# Patient Record
Sex: MALE | Race: WHITE | Employment: UNEMPLOYED | URBAN - METROPOLITAN AREA
[De-identification: names, ages, dates, MRNs, and addresses within clinical notes are randomized per-mention and may not be internally consistent; named-entity substitution may affect disease eponyms.]

---

## 2017-12-05 PROBLEM — E10.42 TYPE 1 DIABETES MELLITUS WITH DIABETIC POLYNEUROPATHY (HCC): Status: ACTIVE | Noted: 2017-12-05

## 2018-03-05 PROBLEM — E10.3393 MODERATE NONPROLIFERATIVE DIABETIC RETINOPATHY OF BOTH EYES WITHOUT MACULAR EDEMA ASSOCIATED WITH TYPE 1 DIABETES MELLITUS (HCC): Status: ACTIVE | Noted: 2018-03-05

## 2018-04-18 PROBLEM — E78.00 HYPERCHOLESTEROLEMIA: Status: ACTIVE | Noted: 2018-04-18

## 2018-07-16 PROBLEM — E10.40 TYPE 1 DIABETES MELLITUS WITH DIABETIC NEUROPATHY (HCC): Status: ACTIVE | Noted: 2018-07-16

## 2018-07-16 PROBLEM — E11.40 TYPE 2 DIABETES MELLITUS WITH DIABETIC NEUROPATHY (HCC): Status: ACTIVE | Noted: 2018-07-16

## 2018-07-16 PROBLEM — E10.9 TYPE 1 DIABETES MELLITUS (HCC): Status: ACTIVE | Noted: 2018-04-18

## 2018-07-16 PROBLEM — E10.649 HYPOGLYCEMIA DUE TO TYPE 1 DIABETES MELLITUS (HCC): Status: ACTIVE | Noted: 2018-07-16

## 2018-08-16 ENCOUNTER — HOSPITAL ENCOUNTER (OUTPATIENT)
Dept: DIABETES SERVICES | Age: 48
Discharge: HOME | End: 2018-08-16
Payer: COMMERCIAL

## 2018-08-16 PROCEDURE — G0108 DIAB MANAGE TRN  PER INDIV: HCPCS

## 2018-08-16 NOTE — PROGRESS NOTES
Came for diabetes educational assessment today. Provided basic information on carbohydrates, proteins and fats. Educational need/plan: Will attend  nutrition/ diabetes sessions to address the following: diabetes disease process, nutritional management, physical activity, using medications, preventing complications, pychosocial adjustment, goal setting, problem solving, monitoring, behavior change strategies. Intense carbohydrate counting will be addressed due to pt with type 1 diabetes. Pt is interested in CGM which will be addressed in the diabetes session. Barrier seen: Pt with ED and PCP gave him a prescription for Viagra but pt states that is too expensive. Pt's fiancee cooks and she will attend education sessions with pt.

## 2018-09-14 PROBLEM — R03.0 ELEVATED BLOOD PRESSURE READING: Status: ACTIVE | Noted: 2018-09-14

## 2018-09-20 ENCOUNTER — HOSPITAL ENCOUNTER (OUTPATIENT)
Dept: DIABETES SERVICES | Age: 48
Discharge: HOME | End: 2018-09-20
Payer: COMMERCIAL

## 2018-09-20 PROCEDURE — G0109 DIAB MANAGE TRN IND/GROUP: HCPCS

## 2018-09-20 NOTE — PROGRESS NOTES
Attended nutrition diabetes group session today. Topics included: type 1 disease process and treatment; carbohydrate choices (emphasizing high fiber carbohydrates); proteins (emphasizing heart healthy choices) and fat food choices (emphasizing unsaturated fats); free foods; combination food choices; nutrition tips for persons with diabetes; snack ideas; resources for diabetes management, fiber and sodium guidelines; sugar substitutes; alcohol; eating out; recipe modification; label reading. Intense carbohydrate counting was taught. Voiced /demonstrated understanding of material covered. Anticipated adherence is good. Problems/barriers may be: none identified at this time. Plan for follow up is attend second nutrition diabetes session on 10/8/18 for continued teaching and practice of carbohydrate counting.

## 2018-10-08 ENCOUNTER — HOSPITAL ENCOUNTER (OUTPATIENT)
Dept: DIABETES SERVICES | Age: 48
Discharge: HOME | End: 2018-10-08
Payer: COMMERCIAL

## 2018-10-08 PROCEDURE — G0109 DIAB MANAGE TRN IND/GROUP: HCPCS

## 2018-10-08 NOTE — PROGRESS NOTES
Client attended nutrition group class for persons with type 1 diabetes. Reviewed what are carbohydrates and intense carbohydrate counting including high fiber foods and foods high in sugar alcohols and high protein meals. Practiced counting carbohydrates. Demonstrated counting carbohydrates correctly. Pt's goal is to control his blood sugars and not have high or low blood sugars, he will count carbohyrates for meals and snacks for one month. Anticipated adherence: good. Pt's support person: Leticia Jordan, his girlfriend. Pt's support plan: Use the books from class and his home gym. Pt verbalized his barrier is the time it takes to count carbohydrates.

## 2018-10-08 NOTE — PROGRESS NOTES
Participant attended Diabetes #1 session today. Topics included: Characteristics/pathophysiology type 1/type 2 diabetes; Goal/acceptable blood glucose ranges/Hgb A1C/interpreting/using results;meters, continuous glucose monitors and insulin pumps, DKA. Using medications safely; Sick day management; Prevention/detection/treatment of acute complications. - Verbalized understanding; Demonstrated ability; Denial/Resistance; Needs reinforcement of material covered. -Goal for next session Diabetes Two   -Anticipated adherence is  good,    -Problems/barriers may be none anticipated at this time.

## 2018-10-15 ENCOUNTER — HOSPITAL ENCOUNTER (OUTPATIENT)
Dept: DIABETES SERVICES | Age: 48
Discharge: HOME | End: 2018-10-15
Payer: COMMERCIAL

## 2018-10-15 PROCEDURE — G0109 DIAB MANAGE TRN IND/GROUP: HCPCS

## 2018-10-15 NOTE — PROGRESS NOTES
Participant attended Diabetes #2 session today. Topics included: Prevention/detection/treatment of chronic complications; sleep apnea; Developing strategies to promote health/change behavior/recommended screenings; Developing strategies to address psychosocial issues; Goal setting. Participants goal/support plan includes Medical Goal:  To control blood sugars I will  Purchase Continuous Glucose Monitoring system ( I have placed order and continue to monitor as needed for Continuous Glucose Monitor and blood sugar control with meter. Plan: For Continuous Glucose Monitor Qualifications : Currently test four times a day Injections are at least three times a day to quality for CGM system. I will monitor CGM once received and communicate with physician for better blood sugar control. Problems/barriers may be:none anticipated; comments: Time is a factor reports very busy but aware he needs to find more time to exercise and control blood sugars.  Insurance  Plan for follow up/Recommendations: mail follow up survey in 3 months

## 2018-10-24 PROBLEM — Z96.41 INSULIN PUMP IN PLACE: Status: ACTIVE | Noted: 2018-10-24

## 2018-10-30 ENCOUNTER — TELEPHONE (OUTPATIENT)
Dept: DIABETES SERVICES | Age: 48
End: 2018-10-30

## 2018-10-30 NOTE — TELEPHONE ENCOUNTER
Called to schedule CGM instruct. Patient reports he started  the CGM on his own. He reports an issue with sensors adhering to his skin and has notified [employer]. Suggested approved adhesive products. He has an appointment here  on 11-7-18 for [facility] instruct. Will notify Luisana Mario with [facility] about possible need for adhesive for the pump.

## 2022-03-18 PROBLEM — E10.40 TYPE 1 DIABETES MELLITUS WITH DIABETIC NEUROPATHY (HCC): Status: ACTIVE | Noted: 2018-07-16

## 2022-03-18 PROBLEM — R03.0 ELEVATED BLOOD PRESSURE READING: Status: ACTIVE | Noted: 2018-09-14

## 2022-03-18 PROBLEM — E10.649 HYPOGLYCEMIA DUE TO TYPE 1 DIABETES MELLITUS (HCC): Status: ACTIVE | Noted: 2018-07-16

## 2022-03-19 PROBLEM — E78.00 HYPERCHOLESTEROLEMIA: Status: ACTIVE | Noted: 2018-04-18

## 2022-03-19 PROBLEM — E10.9: Status: ACTIVE | Noted: 2018-04-18

## 2022-03-20 PROBLEM — E10.3393 MODERATE NONPROLIFERATIVE DIABETIC RETINOPATHY OF BOTH EYES WITHOUT MACULAR EDEMA ASSOCIATED WITH TYPE 1 DIABETES MELLITUS (HCC): Status: ACTIVE | Noted: 2018-03-05

## 2022-03-20 PROBLEM — Z96.41 INSULIN PUMP IN PLACE: Status: ACTIVE | Noted: 2018-10-24

## 2022-06-24 ENCOUNTER — OFFICE VISIT (OUTPATIENT)
Dept: ENDOCRINOLOGY | Age: 52
End: 2022-06-24
Payer: COMMERCIAL

## 2022-06-24 VITALS
HEART RATE: 76 BPM | WEIGHT: 178 LBS | OXYGEN SATURATION: 98 % | BODY MASS INDEX: 29.66 KG/M2 | SYSTOLIC BLOOD PRESSURE: 122 MMHG | DIASTOLIC BLOOD PRESSURE: 68 MMHG | HEIGHT: 65 IN

## 2022-06-24 DIAGNOSIS — I10 PRIMARY HYPERTENSION: ICD-10-CM

## 2022-06-24 DIAGNOSIS — E10.40 TYPE 1 DIABETES MELLITUS WITH DIABETIC NEUROPATHY (HCC): Primary | ICD-10-CM

## 2022-06-24 DIAGNOSIS — Z96.41 INSULIN PUMP IN PLACE: ICD-10-CM

## 2022-06-24 DIAGNOSIS — E10.3393 MODERATE NONPROLIFERATIVE DIABETIC RETINOPATHY OF BOTH EYES WITHOUT MACULAR EDEMA ASSOCIATED WITH TYPE 1 DIABETES MELLITUS (HCC): ICD-10-CM

## 2022-06-24 DIAGNOSIS — E78.00 HYPERCHOLESTEROLEMIA: ICD-10-CM

## 2022-06-24 LAB — HBA1C MFR BLD: 7.8 %

## 2022-06-24 PROCEDURE — 95251 CONT GLUC MNTR ANALYSIS I&R: CPT | Performed by: PHYSICIAN ASSISTANT

## 2022-06-24 PROCEDURE — 83036 HEMOGLOBIN GLYCOSYLATED A1C: CPT | Performed by: PHYSICIAN ASSISTANT

## 2022-06-24 PROCEDURE — 99214 OFFICE O/P EST MOD 30 MIN: CPT | Performed by: PHYSICIAN ASSISTANT

## 2022-06-24 RX ORDER — INSULIN DEGLUDEC INJECTION 100 U/ML
INJECTION, SOLUTION SUBCUTANEOUS
COMMUNITY

## 2022-06-24 RX ORDER — INSULIN PMP CART,AUT,G6/7,CNTR
EACH SUBCUTANEOUS
Qty: 1 KIT | Refills: 0 | Status: SHIPPED | OUTPATIENT
Start: 2022-06-24 | End: 2022-06-24 | Stop reason: SDUPTHER

## 2022-06-24 RX ORDER — BLOOD-GLUCOSE SENSOR
EACH MISCELLANEOUS
COMMUNITY
Start: 2022-05-23 | End: 2022-10-27 | Stop reason: SDUPTHER

## 2022-06-24 RX ORDER — INSULIN PMP CART,AUT,G6/7,CNTR
EACH SUBCUTANEOUS
Qty: 30 EACH | Refills: 3 | Status: SHIPPED | OUTPATIENT
Start: 2022-06-24 | End: 2022-07-14

## 2022-06-24 RX ORDER — INSULIN PMP CART,AUT,G6/7,CNTR
EACH SUBCUTANEOUS
Qty: 1 KIT | Refills: 0 | Status: SHIPPED | OUTPATIENT
Start: 2022-06-24 | End: 2022-07-14

## 2022-06-24 RX ORDER — INSULIN PMP CART,AUT,G6/7,CNTR
EACH SUBCUTANEOUS
Qty: 30 EACH | Refills: 3 | Status: SHIPPED | OUTPATIENT
Start: 2022-06-24 | End: 2022-06-24 | Stop reason: SDUPTHER

## 2022-06-24 RX ORDER — LOSARTAN POTASSIUM 25 MG/1
25 TABLET ORAL DAILY
Qty: 90 TABLET | Refills: 1 | Status: SHIPPED | OUTPATIENT
Start: 2022-06-24 | End: 2022-07-14 | Stop reason: SDUPTHER

## 2022-06-24 RX ORDER — BLOOD-GLUCOSE TRANSMITTER
EACH MISCELLANEOUS
COMMUNITY
Start: 2022-05-23 | End: 2022-10-27 | Stop reason: SDUPTHER

## 2022-06-24 RX ORDER — INSULIN LISPRO 100 [IU]/ML
INJECTION, SOLUTION INTRAVENOUS; SUBCUTANEOUS
COMMUNITY

## 2022-06-24 ASSESSMENT — ENCOUNTER SYMPTOMS
SHORTNESS OF BREATH: 0
COUGH: 1
WHEEZING: 0

## 2022-06-24 NOTE — PROGRESS NOTES
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 The patient utilizes a dexcom G6 continuous glucose monitoring system. The average glucose during the reviewed timeframe was 166 with a standard deviation of 70. There is a pattern of frequent postprandial hyperglycemia after late supper and some hypoglycemia overnight. 1. Type 1 diabetes mellitus with diabetic neuropathy (HCC)  Type 1 diabetes on insulin pump, doing well overall however having some glycemic variability. 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 This is reviewed at length. Insulin setting changes as below. Benefit from hybrid closed-loop system OmniPod 5 now available patient is interested in we will order    - Continuous Blood Gluc Sensor (DEXCOM G6 SENSOR) MISC; USE TO MONITOR BLOOD GLUCOSE, E10.65  - Continuous Blood Gluc Transmit (DEXCOM G6 TRANSMITTER) MISC; USE TO MONITOR BLOOD GLUCOSE, E10.65  - Insulin Infusion Pump MAILE; Pump settings: standard pattern- 24 hour total 18.300 units Time MN  0.95, 3a  0.75, 5am 0.60, 8pm 0.80  Carb Ratio MN  12, 4pm 8 Insulin Sensitivity MN-6a 30, 6a-MN 35 Target low  125 Target high 125 Active Insulin time 4:00 Max Bolus 15 units  Dispense: 1 each;  Refill: 0  - AMB POC HEMOGLOBIN A1C  - Insulin Disposable Pump (OMNIPOD 5 G6 INTRO, GEN 5,) KIT; Use to deliver insulin, E10.65  Dispense: 1 kit; Refill: 0  - Insulin Disposable Pump (OMNIPOD 5 G6 POD, GEN 5,) MISC; Use to deliver insulin, E10.65  Dispense: 30 each; Refill: 3  - AR CONTINUOUS GLUCOSE MONITORING ANALYSIS I&R      2. Insulin pump in place  Is reviewed at length. Patient encouraged to more accurately carb count for best results. Today we will decrease patient's midnight basal from 0.95 down to 0.90. Order OmniPod 5 system  - TRESIBA 100 UNIT/ML SOLN; Inject into the skin IN CASE OF PUMP FAILURE, 18 units SQ daily  - Insulin Infusion Pump MAILE; Pump settings: standard pattern- 24 hour total 18.300 units Time MN  0.95, 3a  0.75, 5am 0.60, 8pm 0.80  Carb Ratio MN  12, 4pm 8 Insulin Sensitivity MN-6a 30, 6a-MN 35 Target low  125 Target high 125 Active Insulin time 4:00 Max Bolus 15 units  Dispense: 1 each; Refill: 0    3. Moderate nonproliferative diabetic retinopathy of both eyes without macular edema associated with type 1 diabetes mellitus ([facility] [address]  Follow up with optho as planned, would benefit from improved glycemic control     4. Primary hypertension  Patient with mild primary hypertension started on lisinopril at previous visit. Patient notes a constant cough worse over the last several weeks without URI symptoms. Stop lisinopril, start losartan, monitor blood pressure    - losartan (COZAAR) 25 MG tablet; Take 1 tablet by mouth daily  Dispense: 90 tablet; Refill: 1    5. Hypercholesterolemia  Last LDL at goal Feb 2022 on atorvastatin 10mg    Lab Results   Component Value Date    LDLCALC 80 02/17/2022                 History of Present Illness:    6/24/2022  Clinic for follow-up on Omni pod eros insulin pump. With hyper and hypoglycemia. During interview becomes clear that he is often guessing his carbohydrates rarely doing any kind of counting or calculation leading to a lot of glycemic variability               2/17/2022   Patient returns clinic doing well overall. We were able to download patient's pump.   Does show some postprandial hyperglycemia in the evenings. No significant hypoglycemia reported. Is tolerating statin and low-dose lisinopril. Stable BP                8/31/2021   Patient returns clinic for follow-up of type 1 diabetes on CGM and OmniPod EROS pump. Unable to download pump data today         5/11/2021   Patient returns clinic doing well with type 1 diabetes on insulin pump therapy experiencing some glycemic variability           Current Regimen: NovoLog by Omni pod pump           2/4/2021   Presents explaining that he has had difficulty with his Arrow's PDM resetting when he changes the battery. Federico Curry has new arrows PDM today to replace old PDM not yet set up   Reports home /77       DIABETES           Date of Diagnosis: Type 1 diabetes mellitus diagnosed 2006. Suffers from frequent hypoglycemia.       Diagnosed in 2006 with polyuria and polydipsia        10/07/2019   Patient returns clinic doing well on insulin pump therapy having recurrent overnight hyperglycemia.  Patient has a very active lifestyle and during the day he has more stable blood  sugar with significant excursions at night.  Reports that his insulin appears less effective on the third day of use and he does work in a hot environment. Federico Curry is concerned about the quality of insulin after 48 hours of use       01/07/2020   Patient returns clinic doing well on insulin therapy, with cooler weather his pot is lasting 72 hours. Holly Bautista is checking blood pressure outside of clinic with diastolic typically  in the 70s and 80s       06/18/2020   Pt RTC for f/u of type 1 DM on insulin pump with CGM, doing well.  Working to carb count more correctly and is eating smaller portions with intentional weight loss               Diet:trying to cut back on carbohydrates.  He eats some sweets at time.  No sweet tea or regular  soft drinks.        Exercise: No regular exercise but active at work             Wt Readings from Last 3 Encounters:        02/17/22  179 lb (81.2 kg) 08/31/21  178 lb (80.7 kg)        05/11/21  175 lb (79.4 kg)                       Wt Readings from Last 3 Encounters:        08/31/21  178 lb (80.7 kg)     05/11/21  175 lb (79.4 kg)     02/04/21  177 lb (80.3 kg)         .                      Wt Readings from Last 3 Encounters:        10/29/20  178 lb (80.7 kg)     06/18/20  168 lb (76.2 kg)     01/07/20  173 lb (78.5 kg)                 Pump: Omnipod       Pump issues: had two pods replaced                 Pump Data: Insulin Pump:                      omnipod EROS pump                        TDD     31.3 units                         Basal                15.4 units, 49%                      Bolus                15.9 units, 51%        Monitoring: dexcom G6         Home blood glucose monitoring frequency:   By review of CGM download over past 30 days  Average blood glucose 166 ± 70  Time in range 58.8%  High 36.7%, Very High 12.6%  Low 4.5%, Very Low 0.7%     Typical Standard Deviation   Fasting 167 71   AC lunch 177 54   AC supper 150 56   Bedtime 186 79     Blood glucose levels are uncontrolled, most significant elevations are at bedtime       Hypoglycemia: +Hypoglycemic awareness.  He has occasional lows in the 50s to 60s, usually during  periods of increased activity, occasional overnight lows, frequently with correction       Hemoglobin A1c:   09/05/2017      7.7%   12/05/2017      9.8%   03/05/2018       7.6%   06/13/2018      8.2%   09/14/2018      6.9%   01/02/2019      6.9%   04/02/2019      6.7%   07/01/2019      6.7%   10/07/2019      7.3%   01/07/2020      6.8%   06/18/2020      6.7%   10/29/2020      6.7%   02/04/2021          7.3%   05/11/2021          7.4%    08/31/2021              7.2%    02/17/2022             7.6%  06/24/2022  7.8%       Microalbumin/Nephropathy:   6/1/2017: Urine microalbumin/creatinine 24.0.   3/5/2018: Creatinine 1.05 (GFR 85).    06/13/2018      Cr 1.12, GFR 78, microalbumin/Cr ratio 18.1   11/08/2018      Cr 1.11, GFR 79   03/28/2019      Cr 1.14, GFR 76   07/02/2019      Cr 1.18, GFR 73, microalbumin/Cr ratio <13.2   01/07/2020      Cr 1.10, .6, microalbumin/Cr ratio 11.6   02/04/2021              Cr 1.14, GFR 75, microalbumin/Cr ratio 13                       Neuropathy: He reports occasional, mild burning, numbness, tingling of feet and hands.  Gabapentin  seems to help the symptoms.       Retinopathy: Eye exam Jan 6083  - he reports retinal bleeding OD>OS. Improving since last visit       Lipids: He tried Crestor  in the past but he reports that his LFTs increased.               Tolerating 20mg Pravastatin with improved compliance       3/5/2018: Total cholesterol 193, triglycerides 49, HDL 86, LDL 97, VLDL 10, AST 42, ALT 66.   06/13/2018  TC- 166, LDL- 86, VLDL- 9,  HDL- 71, TG- 43 -- AST 46, ALT 66   11/08/2018  TC- 153, LDL- 73, VLDL- 14,  HDL- 66, TG- 68 --- AST 40, ALT 52   03/28/2019  TC- 170, LDL- 86, VLDL- 10,  HDL- 74, TG- 48 --- AST 39, ALT 45   01/07/2020  TC- 209, LDL- 114, VLDL- 13,  HDL- 82, TG- 65-- AST 45, ALT 61   06/18/2020  TC- 194, LDL- 106, VLDL- 11,  HDL- 77, TG- 53   Started Atorvastatin 10mg   10/29/2020  TC- 149, LDL- 71, VLDL- 10,  HDL- 68, TG- 41(AST 39, ALT 58)   02/04/2021  TC- 167, LDL- 77, VLDL- 10,  HDL- 80, TG- 49 -(AST 35, ALT 45)                   TSH:               06/13/2018      2.670               01/02/2019      4.10               07/02/2019      3.510               01/07/2020      2.340                   02/04/2021      2.650           Prior Treatment: previously Novolin 70/30 15/25, basal bolus with tresiba/novolog               Allergies & Medications:  Reviewed in chart. Review of Systems   Constitutional: Negative for fever and unexpected weight change. Respiratory: Positive for cough. Negative for shortness of breath and wheezing. Musculoskeletal: Positive for arthralgias (left elbow).        Vital Signs:  /68   Pulse 76   Ht 5' 5\" (1.651 m)   Wt 178 lb (80.7 kg)   SpO2 98%   BMI 29.62 kg/m²       Physical Exam  Constitutional:       Appearance: Normal appearance. Neck:      Thyroid: No thyromegaly. Cardiovascular:      Rate and Rhythm: Normal rate and regular rhythm. Pulmonary:      Effort: Pulmonary effort is normal.      Breath sounds: Normal breath sounds. Abdominal:      Palpations: Abdomen is soft. Feet:      Right foot:      Protective Sensation: 3 sites tested. 3 sites sensed. Left foot:      Protective Sensation: 3 sites tested. 3 sites sensed. Lymphadenopathy:      Cervical: No cervical adenopathy. Skin:     General: Skin is warm and dry. Neurological:      General: No focal deficit present. Mental Status: He is alert. Psychiatric:         Mood and Affect: Mood normal.         Behavior: Behavior normal.         Thought Content: Thought content normal.         Judgment: Judgment normal.             Return in about 3 months (around 9/24/2022) for Type 1 with pump f/u. Portions of this note were generated with the assistance of voice recogniton software. As such, some errors in transcription may be present.

## 2022-07-14 DIAGNOSIS — E10.40 TYPE 1 DIABETES MELLITUS WITH DIABETIC NEUROPATHY (HCC): Primary | ICD-10-CM

## 2022-07-14 DIAGNOSIS — I10 PRIMARY HYPERTENSION: ICD-10-CM

## 2022-07-14 RX ORDER — INSULIN PMP CART,AUT,G6/7,CNTR
EACH SUBCUTANEOUS
Qty: 1 KIT | Refills: 0 | Status: SHIPPED | OUTPATIENT
Start: 2022-07-14

## 2022-07-14 RX ORDER — INSULIN PMP CART,AUT,G6/7,CNTR
EACH SUBCUTANEOUS
Qty: 30 EACH | Refills: 3 | Status: SHIPPED | OUTPATIENT
Start: 2022-07-14

## 2022-07-14 RX ORDER — LOSARTAN POTASSIUM 25 MG/1
25 TABLET ORAL DAILY
Qty: 90 TABLET | Refills: 1 | Status: SHIPPED | OUTPATIENT
Start: 2022-07-14

## 2022-10-27 ENCOUNTER — OFFICE VISIT (OUTPATIENT)
Dept: ENDOCRINOLOGY | Age: 52
End: 2022-10-27
Payer: COMMERCIAL

## 2022-10-27 VITALS
WEIGHT: 177.6 LBS | BODY MASS INDEX: 29.55 KG/M2 | SYSTOLIC BLOOD PRESSURE: 132 MMHG | DIASTOLIC BLOOD PRESSURE: 80 MMHG | HEART RATE: 72 BPM | OXYGEN SATURATION: 97 %

## 2022-10-27 DIAGNOSIS — E10.40 TYPE 1 DIABETES MELLITUS WITH DIABETIC NEUROPATHY (HCC): Primary | ICD-10-CM

## 2022-10-27 DIAGNOSIS — E78.00 HYPERCHOLESTEROLEMIA: ICD-10-CM

## 2022-10-27 DIAGNOSIS — I10 PRIMARY HYPERTENSION: ICD-10-CM

## 2022-10-27 DIAGNOSIS — E10.3393 MODERATE NONPROLIFERATIVE DIABETIC RETINOPATHY OF BOTH EYES WITHOUT MACULAR EDEMA ASSOCIATED WITH TYPE 1 DIABETES MELLITUS (HCC): ICD-10-CM

## 2022-10-27 DIAGNOSIS — Z96.41 INSULIN PUMP IN PLACE: ICD-10-CM

## 2022-10-27 LAB — HBA1C MFR BLD: 7.8 %

## 2022-10-27 PROCEDURE — 99214 OFFICE O/P EST MOD 30 MIN: CPT | Performed by: PHYSICIAN ASSISTANT

## 2022-10-27 PROCEDURE — 83036 HEMOGLOBIN GLYCOSYLATED A1C: CPT | Performed by: PHYSICIAN ASSISTANT

## 2022-10-27 PROCEDURE — 3078F DIAST BP <80 MM HG: CPT | Performed by: PHYSICIAN ASSISTANT

## 2022-10-27 PROCEDURE — 3074F SYST BP LT 130 MM HG: CPT | Performed by: PHYSICIAN ASSISTANT

## 2022-10-27 PROCEDURE — 95251 CONT GLUC MNTR ANALYSIS I&R: CPT | Performed by: PHYSICIAN ASSISTANT

## 2022-10-27 RX ORDER — BLOOD-GLUCOSE SENSOR
EACH MISCELLANEOUS
Qty: 9 EACH | Refills: 3 | Status: SHIPPED | OUTPATIENT
Start: 2022-10-27

## 2022-10-27 RX ORDER — BLOOD-GLUCOSE TRANSMITTER
EACH MISCELLANEOUS
Qty: 1 EACH | Refills: 3 | Status: SHIPPED | OUTPATIENT
Start: 2022-10-27

## 2022-10-27 NOTE — PROGRESS NOTES
[facility]    Kisha Darnell PA-C  [facility]  [address], [address]  [place], [place]  Phone [phone]  Facsimile [phone]          Camella Curling is a 47 y.o. male seen 10/27/2022 for follow up evaluation of type 1 diabetes        Assessment and Plan:    In office COVID-19 PPE worn and precautions taken  Interpretation of 72 hour glucose monitor: At least 72 hours of data were reviewed. The patient utilizes a dexcom G6 continuous glucose monitoring system. The average glucose during the reviewed timeframe was 159 with a standard deviation of 56. There is a pattern of frequent late afternoon hypoglycemia. 1. Type 1 diabetes mellitus with diabetic neuropathy ([address]  Doing well. Insulin setting changes below    - AMB POC HEMOGLOBIN A1C  - Comprehensive Metabolic Panel; Future  - CBC with Auto Differential; Future  - Microalbumin / Creatinine Urine Ratio; Future  - Lipid Panel; Future  - Hemoglobin A1C; Future  - TSH with Reflex; Future  - Continuous Blood Gluc Transmit (DEXCOM G6 TRANSMITTER) MISC; USE TO MONITOR BLOOD GLUCOSE, E10.65  Dispense: 1 each; Refill: 3  - Continuous Blood Gluc Sensor (DEXCOM G6 SENSOR) MISC; USE TO MONITOR BLOOD GLUCOSE, E10.65  Dispense: 9 each; Refill: 3  - Insulin Infusion Pump MAILE; Pump settings: standard pattern- 24 hour total 18.300 units Time MN  0.90, 3a  0.75, 5am 0.55, 8pm 0.80  Carb Ratio MN  11, 4pm 8 Insulin Sensitivity MN-6a 30, 6a-MN 35 Target low  125 Target high 125 Active Insulin time 4:00 Max Bolus 15 units  Dispense: 1 each; Refill: 0  - [place] CONTINUOUS GLUCOSE MONITORING ANALYSIS I&R      2. Insulin pump in place  Discussed the impact of postprandial spikes. Encourage early bolusing. Encourage proper carb counting.   Decrease carb ratio from 12 down to 11 at midnight and decrease 5 AM basal from 0.6 down to 0.55 to help with late afternoon hypoglycemia  - Insulin Infusion Pump MAILE; Pump settings: standard pattern- 24 hour total 18.300 units Time MN  0.90, 3a  0.75, 5am 0.55, 8pm 0.80  Carb Ratio MN  11, 4pm 8 Insulin Sensitivity MN-6a 30, 6a-MN 35 Target low  125 Target high 125 Active Insulin time 4:00 Max Bolus 15 units  Dispense: 1 each; Refill: 0    3. Moderate nonproliferative diabetic retinopathy of both eyes without macular edema associated with type 1 diabetes mellitus ([facility]    Follow up with optho as planned, would benefit from improved glycemic control     4. Primary hypertension  Continue losartan and lieu of lisinopril with improvement of cough. Not monitoring at home. Urged to keep blood pressure log  - Microalbumin / Creatinine Urine Ratio; Future    5. Hypercholesterolemia  Last LDL at goal Feb 2022 on atorvastatin 10mg  - Comprehensive Metabolic Panel; Future  - Lipid Panel; Future      2. Insulin pump in place  Is reviewed at length. Patient encouraged to more accurately carb count for best results. Today we will decrease patient's midnight basal from 0.95 down to 0.90. Order OmniPod 5 system  - TRESIBA 100 UNIT/ML SOLN; Inject into the skin IN CASE OF PUMP FAILURE, 18 units SQ daily  - Insulin Infusion Pump MAILE; Pump settings: standard pattern- 24 hour total 18.300 units Time MN  0.95, 3a  0.75, 5am 0.60, 8pm 0.80  Carb Ratio MN  12, 4pm 8 Insulin Sensitivity MN-6a 30, 6a-MN 35 Target low  125 Target high 125 Active Insulin time 4:00 Max Bolus 15 units  Dispense: 1 each; Refill: 0     3. Moderate nonproliferative diabetic retinopathy of both eyes without macular edema associated with type 1 diabetes mellitus ([facility]  Follow up with optho as planned, would benefit from improved glycemic control      4. Primary hypertension  Patient with mild primary hypertension started on lisinopril at previous visit. Patient notes a constant cough worse over the last several weeks without URI symptoms.   Stop lisinopril, start losartan, monitor blood pressure     - losartan (COZAAR) 25 MG tablet; Take 1 tablet by mouth daily  Dispense: 90 tablet; Refill: 1     5. Hypercholesterolemia  Last LDL at goal Feb 2022 on atorvastatin 10mg       Dennys Armendariz was seen today for follow-up and diabetes. Diagnoses and all orders for this visit:    Type 1 diabetes mellitus with diabetic neuropathy ([facility]  -     AMB POC HEMOGLOBIN A1C  -     Comprehensive Metabolic Panel; Future  -     CBC with Auto Differential; Future  -     Microalbumin / Creatinine Urine Ratio; Future  -     Lipid Panel; Future  -     Hemoglobin A1C; Future  -     TSH with Reflex; Future  -     Continuous Blood Gluc Transmit (DEXCOM G6 TRANSMITTER) MISC; USE TO MONITOR BLOOD GLUCOSE, E10.65  -     Continuous Blood Gluc Sensor (DEXCOM G6 SENSOR) MISC; USE TO MONITOR BLOOD GLUCOSE, E10.65  -     Insulin Infusion Pump MAILE; Pump settings: standard pattern- 24 hour total 18.300 units Time MN  0.90, 3a  0.75, 5am 0.55, 8pm 0.80  Carb Ratio MN  11, 4pm 8 Insulin Sensitivity MN-6a 30, 6a-MN 35 Target low  125 Target high 125 Active Insulin time 4:00 Max Bolus 15 units  -     [place] CONTINUOUS GLUCOSE MONITORING ANALYSIS I&R    Insulin pump in place  -     Insulin Infusion Pump MAILE; Pump settings: standard pattern- 24 hour total 18.300 units Time MN  0.90, 3a  0.75, 5am 0.55, 8pm 0.80  Carb Ratio MN  11, 4pm 8 Insulin Sensitivity MN-6a 30, 6a-MN 35 Target low  125 Target high 125 Active Insulin time 4:00 Max Bolus 15 units    Moderate nonproliferative diabetic retinopathy of both eyes without macular edema associated with type 1 diabetes mellitus (HCC)    Primary hypertension  -     Microalbumin / Creatinine Urine Ratio; Future    Hypercholesterolemia  -     Comprehensive Metabolic Panel; Future  -     Lipid Panel; Future      History of Present Illness:    10/27/2022  Patient with type 1 diabetes on insulin pump returns to clinic for follow-up.   He has received the OmniPod 5 however is completing his last 90-day prescription of his EROS pods. 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 He is aware he can use temporary basal but often forgets    6/24/2022  Clinic for follow-up on Omni pod eros insulin pump. With hyper and hypoglycemia. During interview becomes clear that he is often guessing his carbohydrates rarely doing any kind of counting or calculation leading to a lot of glycemic variability                  2/17/2022   Patient returns clinic doing well overall. We were able to download patient's pump. Does show some postprandial hyperglycemia in the evenings. No significant hypoglycemia reported. Is tolerating statin and low-dose lisinopril. Stable BP                8/31/2021   Patient returns clinic for follow-up of type 1 diabetes on CGM and OmniPod EROS pump. Unable to download pump data today          5/11/2021   Patient returns clinic doing well with type 1 diabetes on insulin pump therapy experiencing some glycemic variability           Current Regimen: NovoLog by Omni pod pump           2/4/2021   Presents explaining that he has had difficulty with his Arrow's PDM resetting when he changes the battery. He has new arrows PDM today to replace old PDM not yet set up   Reports home /77       DIABETES           Date of Diagnosis: Type 1 diabetes mellitus diagnosed 2006. Suffers from frequent hypoglycemia. Diagnosed in 2006 with polyuria and polydipsia        10/07/2019   Patient returns clinic doing well on insulin pump therapy having recurrent overnight hyperglycemia. Patient has a very active lifestyle and during the day he has more stable blood  sugar with significant excursions at night. Reports that his insulin appears less effective on the third day of use and he does work in a hot environment.   He is concerned about the quality of insulin after 48 hours of use       01/07/2020   Patient returns clinic doing well on insulin therapy, with cooler weather his pot is lasting 72 hours. Patient is checking blood pressure outside of clinic with diastolic typically  in the 70s and 80s       06/18/2020   Pt RTC for f/u of type 1 DM on insulin pump with CGM, doing well. Working to carb count more correctly and is eating smaller portions with intentional weight loss               Diet:trying to cut back on carbohydrates. He eats some sweets at time. No sweet tea or regular  soft drinks. Exercise: No regular exercise but active at work               [employer] Readings from Last 3 Encounters:        02/17/22  179 lb (81.2 kg)     08/31/21  178 lb (80.7 kg)        05/11/21  175 lb (79.4 kg)                       Wt Readings from Last 3 Encounters:        08/31/21  178 lb (80.7 kg)     05/11/21  175 lb (79.4 kg)     02/04/21  177 lb (80.3 kg)         . Wt Readings from Last 3 Encounters:        10/29/20  178 lb (80.7 kg)     06/18/20  168 lb (76.2 kg)     01/07/20  173 lb (78.5 kg)                 Pump: Omnipod       Pump issues: had two pods replaced                 Pump Data: Insulin Pump:                      omnipod EROS pump      Insulin Pump:    Omnipod EROS      TDD 31.4 units      Basal   15.5units, 49%    Bolus   16 units, 51%    Home blood glucose monitoring frequency:   By review of CGM download over past 30 days  Average blood glucose 159 ± 56  Time in range 66.5%  High 32.0%, Very High 8.0%  Low 1.5%, Very Low 0.1%     Typical Standard Deviation   Fasting 163 60   AC lunch 158 46   AC supper 152 60   Bedtime 161 77     Blood glucose levels are uncontrolled, most significant elevations are post prandial, some hypoglycemia       Hypoglycemia: +Hypoglycemic awareness.   He has occasional lows in the 50s to 60s, usually during  periods of increased activity, occasional overnight lows, frequently with correction       Hemoglobin A1c: 09/05/2017      7.7%   12/05/2017      9.8%   03/05/2018       7.6%   06/13/2018      8.2%   09/14/2018      6.9%   01/02/2019      6.9%   04/02/2019      6.7%   07/01/2019      6.7%   10/07/2019      7.3%   01/07/2020      6.8%   06/18/2020      6.7%   10/29/2020      6.7%   02/04/2021          7.3%   05/11/2021          7.4%    08/31/2021              7.2%    02/17/2022             7.6%  06/24/2022              7.8%  10/27/2022  7.8%       Microalbumin/Nephropathy:   6/1/2017: Urine microalbumin/creatinine 24.0.   3/5/2018: Creatinine 1.05 (GFR 85). 06/13/2018      Cr 1.12, GFR 78, microalbumin/Cr ratio 18.1   11/08/2018      Cr 1.11, GFR 79   03/28/2019      Cr 1.14, GFR 76   07/02/2019      Cr 1.18, GFR 73, microalbumin/Cr ratio <13.2   01/07/2020      Cr 1.10, .6, microalbumin/Cr ratio 11.6   02/04/2021              Cr 1.14, GFR 75, microalbumin/Cr ratio 13    02/17/2022 Cr 1.24, GFR 67, microalbumin/Cr ratio <9               Neuropathy: He reports occasional, mild burning, numbness, tingling of feet and hands. Gabapentin  seems to help the symptoms. Retinopathy: Eye exam June 2022  - he reports retinal bleeding OD>OS. Improving since last visit       Lipids: He tried Crestor  in the past but he reports that his LFTs increased. Tolerating 20mg Pravastatin with improved compliance       3/5/2018:  Total cholesterol 193, triglycerides 49, HDL 86, LDL 97, VLDL 10, AST 42, ALT 66.   06/13/2018  TC- 166, LDL- 86, VLDL- 9,  HDL- 71, TG- 43 -- AST 46, ALT 66   11/08/2018  TC- 153, LDL- 73, VLDL- 14,  HDL- 66, TG- 68 --- AST 40, ALT 52   03/28/2019  TC- 170, LDL- 86, VLDL- 10,  HDL- 74, TG- 48 --- AST 39, ALT 45   01/07/2020  TC- 209, LDL- 114, VLDL- 13,  HDL- 82, TG- 65-- AST 45, ALT 61   06/18/2020  TC- 194, LDL- 106, VLDL- 11,  HDL- 77, TG- 53   Started Atorvastatin 10mg   10/29/2020  TC- 149, LDL- 71, VLDL- 10,  HDL- 68, TG- 41(AST 39, ALT 58)   02/04/2021  TC- 167, LDL- 77, VLDL- 10, HDL- 80, TG- 49 -(AST 35, ALT 45)                   TSH:               06/13/2018      2.670               01/02/2019      4.10               07/02/2019      3.510               01/07/2020      2.340                   02/04/2021      2.650           Prior Treatment: previously Novolin 70/30 15/25, basal bolus with tresiba/novolog          Allergies & Medications:  Reviewed in chart. Review of Systems    Vital Signs:  /80   Pulse 72   Wt 177 lb 9.6 oz (80.6 kg)   SpO2 97%   BMI 29.55 kg/m²       Physical Exam  Constitutional:       Appearance: Normal appearance. Neck:      Thyroid: No thyromegaly. Cardiovascular:      Rate and Rhythm: Normal rate and regular rhythm. Pulmonary:      Effort: Pulmonary effort is normal.      Breath sounds: Normal breath sounds. Abdominal:      Palpations: Abdomen is soft. Feet:      Right foot:      Protective Sensation: 3 sites tested. 3 sites sensed. Left foot:      Protective Sensation: 3 sites tested. 3 sites sensed. Lymphadenopathy:      Cervical: No cervical adenopathy. Skin:     General: Skin is warm and dry. Neurological:      General: No focal deficit present. Mental Status: He is alert. Psychiatric:         Mood and Affect: Mood normal.         Behavior: Behavior normal.         Thought Content: Thought content normal.         Judgment: Judgment normal.           Return in about 3 months (around 1/27/2023) for Type 1 with pump f/u. Portions of this note were generated with the assistance of voice recogniton software. As such, some errors in transcription may be present.

## 2023-02-20 ENCOUNTER — OFFICE VISIT (OUTPATIENT)
Dept: ENDOCRINOLOGY | Age: 53
End: 2023-02-20
Payer: COMMERCIAL

## 2023-02-20 VITALS
HEART RATE: 75 BPM | DIASTOLIC BLOOD PRESSURE: 82 MMHG | SYSTOLIC BLOOD PRESSURE: 123 MMHG | WEIGHT: 182 LBS | BODY MASS INDEX: 30.29 KG/M2 | OXYGEN SATURATION: 98 %

## 2023-02-20 DIAGNOSIS — Z96.41 INSULIN PUMP IN PLACE: ICD-10-CM

## 2023-02-20 DIAGNOSIS — I10 PRIMARY HYPERTENSION: ICD-10-CM

## 2023-02-20 DIAGNOSIS — E10.40 TYPE 1 DIABETES MELLITUS WITH DIABETIC NEUROPATHY (HCC): Primary | ICD-10-CM

## 2023-02-20 DIAGNOSIS — E10.3393 MODERATE NONPROLIFERATIVE DIABETIC RETINOPATHY OF BOTH EYES WITHOUT MACULAR EDEMA ASSOCIATED WITH TYPE 1 DIABETES MELLITUS (HCC): ICD-10-CM

## 2023-02-20 DIAGNOSIS — E78.00 HYPERCHOLESTEROLEMIA: ICD-10-CM

## 2023-02-20 LAB — HBA1C MFR BLD: 8 %

## 2023-02-20 PROCEDURE — 99214 OFFICE O/P EST MOD 30 MIN: CPT | Performed by: PHYSICIAN ASSISTANT

## 2023-02-20 PROCEDURE — 3074F SYST BP LT 130 MM HG: CPT | Performed by: PHYSICIAN ASSISTANT

## 2023-02-20 PROCEDURE — 3079F DIAST BP 80-89 MM HG: CPT | Performed by: PHYSICIAN ASSISTANT

## 2023-02-20 PROCEDURE — 83036 HEMOGLOBIN GLYCOSYLATED A1C: CPT | Performed by: PHYSICIAN ASSISTANT

## 2023-02-20 PROCEDURE — 95251 CONT GLUC MNTR ANALYSIS I&R: CPT | Performed by: PHYSICIAN ASSISTANT

## 2023-02-20 RX ORDER — ALBUTEROL SULFATE 90 UG/1
2 AEROSOL, METERED RESPIRATORY (INHALATION) 4 TIMES DAILY PRN
Qty: 18 G | Refills: 0 | Status: SHIPPED | OUTPATIENT
Start: 2023-02-20

## 2023-02-20 NOTE — PROGRESS NOTES
[facility] ENDOCRINOLOGY   AND   THYROID NODULE CLINIC    Kylie Frias PA-C  [facility] Endocrinology and Thyroid Nodule Clinic  [address] [place] [address], [address] [address]  Phone [phone]  Facsimile [phone]          Linwood Marshall is a 52 y.o. male seen 2/20/2023 for follow up evaluation of type 1 diabetes on insulin pump        Assessment and Plan:          Interpretation of 72 hour glucose monitor:  At least 72 hours of data were reviewed.  The patient utilizes a dexcom G6 continuous glucose monitoring system.  The average glucose during the reviewed timeframe was 185 with a standard deviation of 69.  There is a pattern of frequent late afternoon hypoglycemia.    1. Type 1 diabetes mellitus with diabetic neuropathy (HCC)  Glycemic control is suboptimal, insulin setting changes as below.  This appears to be aggravated in the context of recurrent URIs.  Patient strongly and repeatedly encouraged to follow-up with primary care as he has been obtaining treatment from urgent care without consistent follow-up.  He does complain of intermittent wheeze when working outdoors and sometimes at night.  He admits this was previously improved when using an inhaler.  He was referred to his original primary care due to a new diagnosis of asthma however he does not believe that he has asthma.  I will order him a Ventolin inhaler for short-term use but strongly encouraged him to follow-up with primary care for further evaluation and treatment.  Patient verbalizes understanding    - AMB POC HEMOGLOBIN A1C  - Comprehensive Metabolic Panel; Future  - CBC with Auto Differential; Future  - Microalbumin / Creatinine Urine Ratio; Future  - Lipid Panel; Future  - Hemoglobin A1C; Future  - TSH with Reflex; Future  - [place] CONTINUOUS GLUCOSE MONITORING ANALYSIS I&R  - Insulin Infusion Pump MAILE; Pump settings: standard pattern- 24 hour total 18.300 units Time MN  0.90, 3a  0.75, 5am 0.65, 8pm 0.80  Carb Ratio  MN  11, 4pm 8 Insulin Sensitivity MN-6a 30, 6a-MN 40 Target low  120 Target high 130 Active Insulin time 4:00 Max Bolus 15 units  Dispense: 1 each; Refill: 0    2. Insulin pump in place  Patient experiencing both hyperand hypoglycemia, primarily hyperglycemia receiving vastly more bolus than basal insulin. Increase 5 AM basal from 0.55 up to 0.65 adjusting target which is currently at 125 for high and low target to a low target of 120 with a correct above threshold of 130 adjust daytime correction at 6 AM from 35 up to 40    - Insulin Infusion Pump MAILE; Pump settings: standard pattern- 24 hour total 18.300 units Time MN  0.90, 3a  0.75, 5am 0.65, 8pm 0.80  Carb Ratio MN  11, 4pm 8 Insulin Sensitivity MN-6a 30, 6a-MN 40 Target low  120 Target high 130 Active Insulin time 4:00 Max Bolus 15 units  Dispense: 1 each; Refill: 0    3. Moderate nonproliferative diabetic retinopathy of both eyes without macular edema associated with type 1 diabetes mellitus ([facility]  Follow up with ophtho as planned    4. Primary hypertension  BP Readings from Last 3 Encounters:   02/20/23 123/82   10/27/22 132/80   06/24/22 122/68         5. Hypercholesterolemia  Last LDL at goal Feb 2022 on atorvastatin 10mg  - Comprehensive Metabolic Panel; Future  - Lipid Panel; Future         Sarah Whitaker was seen today for diabetes. Diagnoses and all orders for this visit:    Type 1 diabetes mellitus with diabetic neuropathy ([facility]  -     AMB POC HEMOGLOBIN A1C  -     Comprehensive Metabolic Panel; Future  -     CBC with Auto Differential; Future  -     Microalbumin / Creatinine Urine Ratio; Future  -     Lipid Panel; Future  -     Hemoglobin A1C; Future  -     TSH with Reflex;  Future  -     [place] CONTINUOUS GLUCOSE MONITORING ANALYSIS I&R  -     Insulin Infusion Pump MAILE; Pump settings: standard pattern- 24 hour total 18.300 units Time MN  0.90, 3a  0.75, 5am 0.65, 8pm 0.80  Carb Ratio MN  11, 4pm 8 Insulin Sensitivity MN-6a 30, 6a-MN 40 Target low  120 Target high 130 Active Insulin time 4:00 Max Bolus 15 units    Insulin pump in place  -     Insulin Infusion Pump MAILE; Pump settings: standard pattern- 24 hour total 18.300 units Time MN  0.90, 3a  0.75, 5am 0.65, 8pm 0.80  Carb Ratio MN  11, 4pm 8 Insulin Sensitivity MN-6a 30, 6a-MN 40 Target low  120 Target high 130 Active Insulin time 4:00 Max Bolus 15 units    Moderate nonproliferative diabetic retinopathy of both eyes without macular edema associated with type 1 diabetes mellitus (HCC)    Primary hypertension    Hypercholesterolemia  -     Comprehensive Metabolic Panel; Future  -     Lipid Panel; Future    Other orders  -     albuterol sulfate HFA (VENTOLIN HFA) 108 (90 Base) MCG/ACT inhaler; Inhale 2 puffs into the lungs 4 times daily as needed for Wheezing          History of Present Illness:        2/20/2023   Interim diabetes HPI:    Patient with type 1 diabetes on insulin pump returns clinic for follow-up. Has had recurrent URIs with highly variable glucose including during recent COVID-19 infection. 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    Current Regimen:     Glucose data:     Insulin Pump:    Omnipod EROS      TDD 36.8units      Basal   13.5 units, 37%    Bolus   23.3 units, 63%    Home blood glucose monitoring frequency:   By review of CGM download over past 30 days  Average blood glucose 185 ± 69  Time in range 44%  High 35%, Very High 17%  Low 3%, Very Low <1%    Range     Blood glucose levels are uncontrolled, most significant elevations are all day      Failed past therapies:   previously Novolin 70/30 15/25, basal bolus with tresiba/novolog Relevant co morbidities:    Denies  HX pancreatitis, DKA, gastroparesis, foot ulcer    Optho:  Eye exam Jan 2022  - he reports retinal bleeding OD>OS. Improving since last visit      Obesity:         Body mass index is 30.29 kg/m². increasing steadily      Wt Readings from Last 3 Encounters:   02/20/23 182 lb (82.6 kg)   10/27/22 177 lb 9.6 oz (80.6 kg)   06/24/22 178 lb (80.7 kg)         CardioVascular:    None     Renal:    Under care of nephro? no    On ARB/ACE-I  losartan - 25 MG      6/1/2017: Urine microalbumin/creatinine 24.0.   3/5/2018: Creatinine 1.05 (GFR 85). 06/13/2018      Cr 1.12, GFR 78, microalbumin/Cr ratio 18.1   11/08/2018      Cr 1.11, GFR 79   03/28/2019      Cr 1.14, GFR 76   07/02/2019      Cr 1.18, GFR 73, microalbumin/Cr ratio <13.2   01/07/2020      Cr 1.10, .6, microalbumin/Cr ratio 11.6   02/04/2021              Cr 1.14, GFR 75, microalbumin/Cr ratio 13    02/17/2022 Cr 1.24, GFR 67, microalbumin/Cr ratio <9       Lipids:     Current therapy atorvastatin - 10 MG with good compliance  He tried Crestor  in the past but he reports that his LFTs increased. Tolerated 20mg Pravastatin with improved compliance but poor control   3/5/2018:  Total cholesterol 193, triglycerides 49, HDL 86, LDL 97, VLDL 10, AST 42, ALT 66.   06/13/2018  TC- 166, LDL- 86, VLDL- 9,  HDL- 71, TG- 43 -- AST 46, ALT 66   11/08/2018  TC- 153, LDL- 73, VLDL- 14,  HDL- 66, TG- 68 --- AST 40, ALT 52   03/28/2019  TC- 170, LDL- 86, VLDL- 10,  HDL- 74, TG- 48 --- AST 39, ALT 45   01/07/2020  TC- 209, LDL- 114, VLDL- 13,  HDL- 82, TG- 65-- AST 45, ALT 61   06/18/2020  TC- 194, LDL- 106, VLDL- 11,  HDL- 77, TG- 53   Started Atorvastatin 10mg   10/29/2020  TC- 149, LDL- 71, VLDL- 10,  HDL- 68, TG- 41(AST 39, ALT 58)   02/04/2021  TC- 167, LDL- 77, VLDL- 10,  HDL- 80, TG- 49 -(AST 35, ALT 45)    02/17/2022  TC- 157, LDL- 80, VLDL- 11,  HDL- 65, TG- 61 (AST 43, 48)        Lab Results   Component Value Date    CHOL 157 02/17/2022    CHOL 167 02/04/2021    CHOL 149 10/29/2020     Lab Results   Component Value Date    LDLCALC 80 02/17/2022    LDLCALC 77 02/04/2021    LDLCALC 71 10/29/2020      Lab Results   Component Value Date    LABVLDL 11 06/18/2020    LABVLDL 13 01/07/2020    LABVLDL 8 07/02/2019    VLDL 12 02/17/2022    VLDL 10 02/04/2021    VLDL 10 10/29/2020      Lab Results   Component Value Date    HDL 65 02/17/2022    HDL 80 02/04/2021    HDL 68 10/29/2020      Lab Results   Component Value Date    HDL 65 02/17/2022    HDL 80 02/04/2021    HDL 68 10/29/2020      Lab Results   Component Value Date    TRIG 61 02/17/2022    TRIG 49 02/04/2021    TRIG 41 10/29/2020     No results found for: CHOLHDLRATIO      Hemoglobin A1c:  09/05/2017      7.7%   12/05/2017      9.8%   03/05/2018       7.6%   06/13/2018      8.2%   09/14/2018      6.9%   01/02/2019      6.9%   04/02/2019      6.7%   07/01/2019      6.7%   10/07/2019      7.3%   01/07/2020      6.8%   06/18/2020      6.7%   10/29/2020      6.7%   02/04/2021          7.3%   05/11/2021          7.4%    08/31/2021              7.2%    02/17/2022             7.6%  06/24/2022              7.8%  10/27/2022  7.8%  02/20/2023  8.0%  Hemoglobin A1C, POC   Date Value Ref Range Status   02/20/2023 8.0 % Final   10/27/2022 7.8 % Final   06/24/2022 7.8 % Final        Thyroid:   06/13/2018      2.670               01/02/2019      4.10               07/02/2019      3.510               01/07/2020      2.340                   02/04/2021      2.650      Lab Results   Component Value Date/Time    TSH 2.360 02/17/2022 09:38 AM    TSH 2.650 02/04/2021 09:38 AM    TSH 2.340 01/07/2020 09:35 AM          Reviewed and updated this visit by provider:  Tobacco  Allergies  Meds  Problems  Med Hx  Surg Hx  Fam Hx                    Allergies & Medications:  Reviewed in chart.         Review of Systems    Vital Signs:  /82   Pulse 75   Wt 182 lb (82.6 kg)   SpO2 98% BMI 30.29 kg/m²       Physical Exam  Constitutional:       Appearance: Normal appearance. He is obese. Neck:      Thyroid: No thyromegaly. Cardiovascular:      Rate and Rhythm: Normal rate and regular rhythm. Pulmonary:      Effort: Pulmonary effort is normal.      Breath sounds: Normal breath sounds. Abdominal:      Palpations: Abdomen is soft. Feet:      Right foot:      Protective Sensation: 3 sites tested. 3 sites sensed. Left foot:      Protective Sensation: 3 sites tested. 3 sites sensed. Lymphadenopathy:      Cervical: No cervical adenopathy. Skin:     General: Skin is warm and dry. Neurological:      General: No focal deficit present. Mental Status: He is alert. Psychiatric:         Mood and Affect: Mood normal.         Behavior: Behavior normal.         Thought Content: Thought content normal.         Judgment: Judgment normal.           Return in about 3 months (around 5/20/2023) for Type 1 with pump f/u. Portions of this note were generated with the assistance of voice recogniton software. As such, some errors in transcription may be present.

## 2023-02-20 NOTE — Clinical Note
Dr. Rosenda Saucedo encouraged him to schedule a much overdue follow up with you. Chronic cough is his biggest complaint. I've changed his ACE-I to ARB, he has had multiple URIs lately. He has had benefit from albuterol in past so I sent him in an MDI but he knows that following up with your office is important. Just a head's up.  Respectfully, Lulú Flanagan

## 2023-03-12 DIAGNOSIS — E78.00 PURE HYPERCHOLESTEROLEMIA, UNSPECIFIED: ICD-10-CM

## 2023-03-12 DIAGNOSIS — I10 PRIMARY HYPERTENSION: ICD-10-CM

## 2023-03-12 DIAGNOSIS — E10.40 TYPE 1 DIABETES MELLITUS WITH DIABETIC NEUROPATHY (HCC): ICD-10-CM

## 2023-03-13 RX ORDER — LOSARTAN POTASSIUM 25 MG/1
TABLET ORAL
Qty: 90 TABLET | Refills: 1 | OUTPATIENT
Start: 2023-03-13

## 2023-03-13 RX ORDER — PROCHLORPERAZINE 25 MG/1
SUPPOSITORY RECTAL
Refills: 3 | OUTPATIENT
Start: 2023-03-13

## 2023-03-13 RX ORDER — ATORVASTATIN CALCIUM 10 MG/1
TABLET, FILM COATED ORAL
Qty: 90 TABLET | Refills: 3 | OUTPATIENT
Start: 2023-03-13

## 2023-03-17 ENCOUNTER — TELEPHONE (OUTPATIENT)
Dept: FAMILY MEDICINE CLINIC | Facility: CLINIC | Age: 53
End: 2023-03-17

## 2023-03-17 NOTE — TELEPHONE ENCOUNTER
Patient called stating that yesterday he started to notice tingling in the left side of his face. Patient stated that today he is having some tingling in his left arm and also in his hand. Patient denies n/v, SOB, or jaw pain. Informed patient that these symptoms can be signs of a serious health issue and patient should seek emergent care asap. Told patient to call and keep PCP informed. Patient expressed understanding.

## 2023-03-20 RX ORDER — ALBUTEROL SULFATE 90 UG/1
2 AEROSOL, METERED RESPIRATORY (INHALATION) 4 TIMES DAILY PRN
Qty: 18 EACH | OUTPATIENT
Start: 2023-03-20

## 2023-03-23 ENCOUNTER — TELEPHONE (OUTPATIENT)
Dept: FAMILY MEDICINE CLINIC | Facility: CLINIC | Age: 53
End: 2023-03-23

## 2023-03-23 NOTE — TELEPHONE ENCOUNTER
Patient has not been seen By Dr Guzman Orantes in 5 Years he had a recent ED visit and his endocrinologist is asking for him to check see his PCP can Dr Guzman Orantes see him or does he need to find a new provider?

## 2023-04-05 ENCOUNTER — OFFICE VISIT (OUTPATIENT)
Dept: FAMILY MEDICINE CLINIC | Facility: CLINIC | Age: 53
End: 2023-04-05
Payer: COMMERCIAL

## 2023-04-05 VITALS
BODY MASS INDEX: 30.19 KG/M2 | DIASTOLIC BLOOD PRESSURE: 85 MMHG | HEIGHT: 65 IN | HEART RATE: 76 BPM | TEMPERATURE: 98 F | SYSTOLIC BLOOD PRESSURE: 128 MMHG | OXYGEN SATURATION: 99 % | WEIGHT: 181.2 LBS | RESPIRATION RATE: 16 BRPM

## 2023-04-05 DIAGNOSIS — R20.0 FACIAL NUMBNESS: ICD-10-CM

## 2023-04-05 DIAGNOSIS — R20.2 PARESTHESIA: ICD-10-CM

## 2023-04-05 DIAGNOSIS — Z11.4 SCREENING FOR HIV (HUMAN IMMUNODEFICIENCY VIRUS): ICD-10-CM

## 2023-04-05 DIAGNOSIS — R20.0 NUMBNESS OF LEFT HAND: Primary | ICD-10-CM

## 2023-04-05 DIAGNOSIS — I10 PRIMARY HYPERTENSION: ICD-10-CM

## 2023-04-05 DIAGNOSIS — Z11.59 ENCOUNTER FOR HEPATITIS C SCREENING TEST FOR LOW RISK PATIENT: ICD-10-CM

## 2023-04-05 DIAGNOSIS — Z12.11 SCREENING FOR MALIGNANT NEOPLASM OF COLON: ICD-10-CM

## 2023-04-05 PROCEDURE — 3074F SYST BP LT 130 MM HG: CPT | Performed by: FAMILY MEDICINE

## 2023-04-05 PROCEDURE — 99204 OFFICE O/P NEW MOD 45 MIN: CPT | Performed by: FAMILY MEDICINE

## 2023-04-05 PROCEDURE — 3079F DIAST BP 80-89 MM HG: CPT | Performed by: FAMILY MEDICINE

## 2023-04-05 SDOH — ECONOMIC STABILITY: INCOME INSECURITY: HOW HARD IS IT FOR YOU TO PAY FOR THE VERY BASICS LIKE FOOD, HOUSING, MEDICAL CARE, AND HEATING?: NOT HARD AT ALL

## 2023-04-05 SDOH — ECONOMIC STABILITY: HOUSING INSECURITY
IN THE LAST 12 MONTHS, WAS THERE A TIME WHEN YOU DID NOT HAVE A STEADY PLACE TO SLEEP OR SLEPT IN A SHELTER (INCLUDING NOW)?: NO

## 2023-04-05 SDOH — ECONOMIC STABILITY: FOOD INSECURITY: WITHIN THE PAST 12 MONTHS, THE FOOD YOU BOUGHT JUST DIDN'T LAST AND YOU DIDN'T HAVE MONEY TO GET MORE.: NEVER TRUE

## 2023-04-05 SDOH — ECONOMIC STABILITY: FOOD INSECURITY: WITHIN THE PAST 12 MONTHS, YOU WORRIED THAT YOUR FOOD WOULD RUN OUT BEFORE YOU GOT MONEY TO BUY MORE.: NEVER TRUE

## 2023-04-05 ASSESSMENT — ANXIETY QUESTIONNAIRES
GAD7 TOTAL SCORE: 0
2. NOT BEING ABLE TO STOP OR CONTROL WORRYING: 0
1. FEELING NERVOUS, ANXIOUS, OR ON EDGE: 0
5. BEING SO RESTLESS THAT IT IS HARD TO SIT STILL: 0
3. WORRYING TOO MUCH ABOUT DIFFERENT THINGS: 0
6. BECOMING EASILY ANNOYED OR IRRITABLE: 0
4. TROUBLE RELAXING: 0
IF YOU CHECKED OFF ANY PROBLEMS ON THIS QUESTIONNAIRE, HOW DIFFICULT HAVE THESE PROBLEMS MADE IT FOR YOU TO DO YOUR WORK, TAKE CARE OF THINGS AT HOME, OR GET ALONG WITH OTHER PEOPLE: NOT DIFFICULT AT ALL
7. FEELING AFRAID AS IF SOMETHING AWFUL MIGHT HAPPEN: 0

## 2023-04-05 ASSESSMENT — PATIENT HEALTH QUESTIONNAIRE - PHQ9
2. FEELING DOWN, DEPRESSED OR HOPELESS: 0
1. LITTLE INTEREST OR PLEASURE IN DOING THINGS: 0
SUM OF ALL RESPONSES TO PHQ QUESTIONS 1-9: 0
SUM OF ALL RESPONSES TO PHQ QUESTIONS 1-9: 0
SUM OF ALL RESPONSES TO PHQ9 QUESTIONS 1 & 2: 0
SUM OF ALL RESPONSES TO PHQ QUESTIONS 1-9: 0
SUM OF ALL RESPONSES TO PHQ QUESTIONS 1-9: 0

## 2023-04-05 NOTE — PROGRESS NOTES
Insulin Disposable Pump (OMNIPOD 5 G6 POD, GEN 5,) MISC Use to deliver insulin, E10.65 30 each 3    losartan (COZAAR) 25 MG tablet Take 1 tablet by mouth daily 90 tablet 1    HUMALOG 100 UNIT/ML SOLN injection vial Inject into the skin Use with insulin pump, max daily dose 66 units      atorvastatin (LIPITOR) 10 MG tablet Take 1 tablet by mouth daily      clotrimazole (LOTRIMIN) 1 % cream Apply topically 2 times daily       No current facility-administered medications for this visit. Mr. Alejandra Hargrove History    Marital status:      Spouse name: None    Number of children: None    Years of education: None    Highest education level: None   Tobacco Use    Smoking status: Former     Packs/day: 1.00     Types: Cigarettes     Quit date: 2015     Years since quittin.9    Smokeless tobacco: Never   Substance and Sexual Activity    Alcohol use: Yes     Social Determinants of Health     Financial Resource Strain: Low Risk     Difficulty of Paying Living Expenses: Not hard at all   Food Insecurity: No Food Insecurity    Worried About [address] [facility] [employer] in the Last Year: Never true    [address] [employer] in the Last Year: Never true   Transportation Needs: Unknown    Lack of Transportation (Non-Medical): No   Housing Stability: Unknown    Unstable Housing in the Last Year: No       Mr. Marshall   Family History   Problem Relation Age of Onset    Thyroid Disease Neg Hx     Cancer Mother     Diabetes Brother         type I    Diabetes Maternal Uncle     Asthma Brother     Diabetes Brother         type I            Mr. Bhavana Reese  has the following allergies:    Allergies   Allergen Reactions    Other Swelling     shrimp  shrimp      Azithromycin Nausea And Vomiting       /85 (Site: Left Upper Arm)   Pulse 76   Temp 98 °F (36.7 °C) (Temporal)   Resp 16   Ht 5' 5\" (1.651 m)   Wt 181 lb 3.2 oz (82.2 kg)   SpO2 99%   BMI 30.15 kg/m²     HEENT: Normocephalic, atraumatic,

## 2023-05-18 DIAGNOSIS — E10.40 TYPE 1 DIABETES MELLITUS WITH DIABETIC NEUROPATHY (HCC): ICD-10-CM

## 2023-05-18 RX ORDER — PROCHLORPERAZINE 25 MG/1
SUPPOSITORY RECTAL
Refills: 3 | OUTPATIENT
Start: 2023-05-18

## 2023-05-19 DIAGNOSIS — E10.40 TYPE 1 DIABETES MELLITUS WITH DIABETIC NEUROPATHY (HCC): ICD-10-CM

## 2023-05-19 RX ORDER — INSULIN PMP CART,AUT,G6/7,CNTR
EACH SUBCUTANEOUS
Refills: 3 | OUTPATIENT
Start: 2023-05-19

## 2023-06-03 DIAGNOSIS — I10 PRIMARY HYPERTENSION: ICD-10-CM

## 2023-06-05 RX ORDER — LOSARTAN POTASSIUM 25 MG/1
TABLET ORAL
Qty: 90 TABLET | Refills: 1 | OUTPATIENT
Start: 2023-06-05

## 2023-06-08 ENCOUNTER — PROCEDURE VISIT (OUTPATIENT)
Dept: PHYSICAL MEDICINE AND REHAB | Age: 53
End: 2023-06-08
Payer: COMMERCIAL

## 2023-06-08 VITALS
BODY MASS INDEX: 30.16 KG/M2 | WEIGHT: 181 LBS | HEIGHT: 65 IN | DIASTOLIC BLOOD PRESSURE: 87 MMHG | HEART RATE: 71 BPM | SYSTOLIC BLOOD PRESSURE: 147 MMHG

## 2023-06-08 DIAGNOSIS — G56.02 LEFT CARPAL TUNNEL SYNDROME: Primary | ICD-10-CM

## 2023-06-08 PROCEDURE — 95886 MUSC TEST DONE W/N TEST COMP: CPT | Performed by: PHYSICAL MEDICINE & REHABILITATION

## 2023-06-08 PROCEDURE — 95910 NRV CNDJ TEST 7-8 STUDIES: CPT | Performed by: PHYSICAL MEDICINE & REHABILITATION

## 2023-06-20 ENCOUNTER — OFFICE VISIT (OUTPATIENT)
Dept: ENDOCRINOLOGY | Age: 53
End: 2023-06-20
Payer: COMMERCIAL

## 2023-06-20 VITALS
WEIGHT: 182 LBS | SYSTOLIC BLOOD PRESSURE: 128 MMHG | HEART RATE: 78 BPM | DIASTOLIC BLOOD PRESSURE: 82 MMHG | BODY MASS INDEX: 30.29 KG/M2 | OXYGEN SATURATION: 97 %

## 2023-06-20 DIAGNOSIS — E10.40 TYPE 1 DIABETES MELLITUS WITH DIABETIC NEUROPATHY (HCC): Primary | ICD-10-CM

## 2023-06-20 DIAGNOSIS — I10 PRIMARY HYPERTENSION: ICD-10-CM

## 2023-06-20 DIAGNOSIS — E78.00 HYPERCHOLESTEROLEMIA: ICD-10-CM

## 2023-06-20 DIAGNOSIS — Z96.41 INSULIN PUMP IN PLACE: ICD-10-CM

## 2023-06-20 DIAGNOSIS — E10.40 TYPE 1 DIABETES MELLITUS WITH DIABETIC NEUROPATHY (HCC): ICD-10-CM

## 2023-06-20 LAB — HBA1C MFR BLD: 7.1 %

## 2023-06-20 PROCEDURE — 95251 CONT GLUC MNTR ANALYSIS I&R: CPT | Performed by: PHYSICIAN ASSISTANT

## 2023-06-20 PROCEDURE — 3079F DIAST BP 80-89 MM HG: CPT | Performed by: PHYSICIAN ASSISTANT

## 2023-06-20 PROCEDURE — 3074F SYST BP LT 130 MM HG: CPT | Performed by: PHYSICIAN ASSISTANT

## 2023-06-20 PROCEDURE — 83036 HEMOGLOBIN GLYCOSYLATED A1C: CPT | Performed by: PHYSICIAN ASSISTANT

## 2023-06-20 PROCEDURE — 99214 OFFICE O/P EST MOD 30 MIN: CPT | Performed by: PHYSICIAN ASSISTANT

## 2023-06-20 RX ORDER — PROCHLORPERAZINE 25 MG/1
SUPPOSITORY RECTAL
Qty: 1 EACH | Refills: 3 | Status: SHIPPED | OUTPATIENT
Start: 2023-06-20

## 2023-06-20 RX ORDER — INSULIN PMP CART,AUT,G6/7,CNTR
EACH SUBCUTANEOUS
Qty: 30 EACH | Refills: 3 | Status: SHIPPED | OUTPATIENT
Start: 2023-06-20

## 2023-06-20 RX ORDER — ATORVASTATIN CALCIUM 10 MG/1
10 TABLET, FILM COATED ORAL DAILY
Qty: 90 TABLET | Refills: 3 | Status: SHIPPED | OUTPATIENT
Start: 2023-06-20

## 2023-06-20 RX ORDER — LOSARTAN POTASSIUM 25 MG/1
25 TABLET ORAL DAILY
Qty: 90 TABLET | Refills: 3 | Status: SHIPPED | OUTPATIENT
Start: 2023-06-20

## 2023-06-20 RX ORDER — PROCHLORPERAZINE 25 MG/1
SUPPOSITORY RECTAL
Qty: 9 EACH | Refills: 3 | Status: SHIPPED | OUTPATIENT
Start: 2023-06-20

## 2023-06-20 RX ORDER — INSULIN LISPRO 100 [IU]/ML
INJECTION, SOLUTION INTRAVENOUS; SUBCUTANEOUS
Qty: 70 ML | Refills: 3 | Status: SHIPPED | OUTPATIENT
Start: 2023-06-20

## 2023-06-20 RX ORDER — GLUCAGON INJECTION, SOLUTION 1 MG/.2ML
1 INJECTION, SOLUTION SUBCUTANEOUS PRN
Qty: 2 EACH | Refills: 1 | Status: SHIPPED | OUTPATIENT
Start: 2023-06-20

## 2023-06-20 RX ORDER — INSULIN LISPRO 100 [IU]/ML
INJECTION, SOLUTION INTRAVENOUS; SUBCUTANEOUS
Qty: 70 ML | Refills: 3 | Status: SHIPPED | OUTPATIENT
Start: 2023-06-20 | End: 2023-06-20 | Stop reason: SDUPTHER

## 2023-06-20 NOTE — PROGRESS NOTES
Unable to find pt's new pharmacy [facility]  Fax [phone]  Phone [phone]    Can you fax kaye Guzman, can we work to add this pharmacy if it is not in epic already

## 2023-06-20 NOTE — PROGRESS NOTES
Unable to find pt's new pharmacy [facility]  Fax [phone]  Phone [phone]     Can you fax printed Kel Murrell, can we work to add this pharmacy if it is not in epic already

## 2023-06-20 NOTE — PROGRESS NOTES
[facility] ENDOCRINOLOGY   AND   THYROID NODULE CLINIC    Aimee Louis PA-C  [facility] Endocrinology and Thyroid Nodule Clinic  [address], [address]  [place], [address] [place] [address]  Phone [phone]  Facsimile [phone]          Tray Robles is a 47 y.o. male seen 6/20/2023 for follow up evaluation of type 1 diabetes on insulin pump        Assessment and Plan:          Interpretation of 72 hour glucose monitor: At least 72 hours of data were reviewed. The patient utilizes a dexcom G6 continuous glucose monitoring system. The average glucose during the reviewed timeframe was 149 with a standard deviation of 51. There is a pattern of frequent post prandial hyperglycemia and some hypoglycemia with activity. 1. Type 1 diabetes mellitus with diabetic neuropathy [facility])  Patient doing well overall. Has transition to the OmniPod 5 system, self instructed. Did not transition settings efficiently from old pump. We reviewed appropriate use of activity mode and extended bolus    Leave reverse correction on      -  DIABETES FOOT EXAM  - [place] CONTINUOUS GLUCOSE MONITORING ANALYSIS I&R  - AMB POC HEMOGLOBIN A1C  - HUMALOG 100 UNIT/ML SOLN injection vial; Use with insulin pump, max daily dose 75 units  Dispense: 70 mL; Refill: 3  - Insulin Disposable Pump (OMNIPOD 5 G6 POD, GEN 5,) MISC; Use to deliver insulin, E10.65  Dispense: 30 each; Refill: 3  - Continuous Blood Gluc Transmit (DEXCOM G6 TRANSMITTER) MISC; USE TO MONITOR BLOOD GLUCOSE, E10.65  Dispense: 1 each; Refill: 3  - Continuous Blood Gluc Sensor (DEXCOM G6 SENSOR) MISC; USE TO MONITOR BLOOD GLUCOSE, E10.65  Dispense: 9 each; Refill: 3  - GVOKE HYPOPEN 2-PACK 1 MG/0.2ML SOAJ; Inject 1 mg into the skin as needed (severe hypoglycemia)  Dispense: 2 each;  Refill: 1  - Insulin Infusion Pump MAILE; Pump settings: OMNIPOD 5 standard pattern- 24 hour total 19.125 units Time MN  0.90, 6am 0.7, 8pm 1.1 Carb Ratio MN  10.5, 4pm 9 Insulin

## 2023-07-10 ENCOUNTER — OFFICE VISIT (OUTPATIENT)
Dept: ORTHOPEDIC SURGERY | Age: 53
End: 2023-07-10
Payer: COMMERCIAL

## 2023-07-10 VITALS — HEIGHT: 64 IN | BODY MASS INDEX: 30.73 KG/M2 | WEIGHT: 180 LBS

## 2023-07-10 DIAGNOSIS — G56.02 LEFT CARPAL TUNNEL SYNDROME: Primary | ICD-10-CM

## 2023-07-10 PROCEDURE — 99204 OFFICE O/P NEW MOD 45 MIN: CPT | Performed by: ORTHOPAEDIC SURGERY

## 2023-07-10 NOTE — PROGRESS NOTES
Orthopaedic Hand Surgery Note    Name: Jacob Skaggs  YOB: 1970  Gender: male  MRN: [id number]    CC: New patient referred for hand numbness      HPI: Patient is a 47 y.o. male with a chief complaint of left arm numbness and tingling. He says the hand is minimally bothersome, but about 6-12 months ago he had numbness on the entire left side of his face and into the arm. Most of the symptoms have subsided, but he still has numbness around the eye. He was evaluated for MI and CVA, and then sent for a nerve conduction study. The patient does not complain of night wakening and increased symptoms with driving. ROS/Meds/PSH/PMH/FH/SH: I personally reviewed the patients standard intake form. Pertinents are discussed In the HPI    Physical Examination:    Musculoskeletal:     Examination of the left upper extremity demonstrates Normal sensation to light touch in the median distribution, normal sensation in ulnar and radial distribution, equivocal carpal tunnel compression testing and Phalen testing, cap refill < 5 seconds in all fingers. Inspection reveals no thenar atrophy. Tinel and elbow flexion compression test of the cubital tunnel, negative Tinel over Guyon's canal. Sensation to light touch in the ulnar 2 digits is normal with no intrinsic atrophy/weakness. No tenderness to palpation or masses noted in the forearm. Imaging / Electrodiagnostic Tests:     I Independently reviewed and interpreted the patient's nerve conduction study. He has findings consistent with mild carpal tunnel syndrome. Left median sensory latency is 4.2 ms, left median motor latency is 4.4 ms, amplitude is 2.9 mV. Needle EMG is normal negative     Assessment:     ICD-10-CM    1. Left carpal tunnel syndrome  G56.02           Plan:  We discussed the diagnosis and different treatment options.  We discussed observation, EMG/NCV, night splinting, cortisone injections and surgical decompression and the risks and

## 2023-11-02 ENCOUNTER — TELEPHONE (OUTPATIENT)
Dept: DIABETES SERVICES | Age: 53
End: 2023-11-02

## 2023-11-02 ENCOUNTER — OFFICE VISIT (OUTPATIENT)
Dept: ENDOCRINOLOGY | Age: 53
End: 2023-11-02

## 2023-11-02 VITALS
HEART RATE: 83 BPM | SYSTOLIC BLOOD PRESSURE: 146 MMHG | DIASTOLIC BLOOD PRESSURE: 82 MMHG | OXYGEN SATURATION: 99 % | WEIGHT: 183.2 LBS | BODY MASS INDEX: 31.45 KG/M2

## 2023-11-02 DIAGNOSIS — Z96.41 INSULIN PUMP IN PLACE: ICD-10-CM

## 2023-11-02 DIAGNOSIS — I10 PRIMARY HYPERTENSION: ICD-10-CM

## 2023-11-02 DIAGNOSIS — E55.9 VITAMIN D DEFICIENCY, UNSPECIFIED: Chronic | ICD-10-CM

## 2023-11-02 DIAGNOSIS — E10.40 TYPE 1 DIABETES MELLITUS WITH DIABETIC NEUROPATHY (HCC): ICD-10-CM

## 2023-11-02 DIAGNOSIS — I10 PRIMARY HYPERTENSION: Chronic | ICD-10-CM

## 2023-11-02 DIAGNOSIS — E55.9 VITAMIN D DEFICIENCY, UNSPECIFIED: ICD-10-CM

## 2023-11-02 DIAGNOSIS — E78.00 HYPERCHOLESTEROLEMIA: Chronic | ICD-10-CM

## 2023-11-02 DIAGNOSIS — E10.40 TYPE 1 DIABETES MELLITUS WITH DIABETIC NEUROPATHY (HCC): Primary | ICD-10-CM

## 2023-11-02 DIAGNOSIS — Z71.89 CARDIAC RISK COUNSELING: ICD-10-CM

## 2023-11-02 DIAGNOSIS — E78.00 HYPERCHOLESTEROLEMIA: ICD-10-CM

## 2023-11-02 DIAGNOSIS — E10.3393 MODERATE NONPROLIFERATIVE DIABETIC RETINOPATHY OF BOTH EYES WITHOUT MACULAR EDEMA ASSOCIATED WITH TYPE 1 DIABETES MELLITUS (HCC): Chronic | ICD-10-CM

## 2023-11-02 LAB
25(OH)D3 SERPL-MCNC: 30.8 NG/ML (ref 30–100)
ALBUMIN SERPL-MCNC: 4.1 G/DL (ref 3.5–5)
ALBUMIN/GLOB SERPL: 1.2 (ref 0.4–1.6)
ALP SERPL-CCNC: 88 U/L (ref 50–136)
ALT SERPL-CCNC: 41 U/L (ref 12–65)
ANION GAP SERPL CALC-SCNC: 5 MMOL/L (ref 2–11)
AST SERPL-CCNC: 31 U/L (ref 15–37)
BASOPHILS # BLD: 0 K/UL (ref 0–0.2)
BASOPHILS NFR BLD: 1 % (ref 0–2)
BILIRUB SERPL-MCNC: 0.4 MG/DL (ref 0.2–1.1)
BUN SERPL-MCNC: 13 MG/DL (ref 6–23)
CALCIUM SERPL-MCNC: 9.4 MG/DL (ref 8.3–10.4)
CHLORIDE SERPL-SCNC: 103 MMOL/L (ref 101–110)
CHOLEST SERPL-MCNC: 191 MG/DL
CO2 SERPL-SCNC: 29 MMOL/L (ref 21–32)
CREAT SERPL-MCNC: 1.3 MG/DL (ref 0.8–1.5)
CREAT UR-MCNC: 30 MG/DL
DIFFERENTIAL METHOD BLD: ABNORMAL
EOSINOPHIL # BLD: 0.2 K/UL (ref 0–0.8)
EOSINOPHIL NFR BLD: 3 % (ref 0.5–7.8)
ERYTHROCYTE [DISTWIDTH] IN BLOOD BY AUTOMATED COUNT: 13.2 % (ref 11.9–14.6)
GLOBULIN SER CALC-MCNC: 3.5 G/DL (ref 2.8–4.5)
GLUCOSE SERPL-MCNC: 177 MG/DL (ref 65–100)
HBA1C MFR BLD: 7.3 %
HCT VFR BLD AUTO: 41.7 % (ref 41.1–50.3)
HDLC SERPL-MCNC: 83 MG/DL (ref 40–60)
HDLC SERPL: 2.3
HGB BLD-MCNC: 13.4 G/DL (ref 13.6–17.2)
IMM GRANULOCYTES # BLD AUTO: 0 K/UL (ref 0–0.5)
IMM GRANULOCYTES NFR BLD AUTO: 1 % (ref 0–5)
LDLC SERPL CALC-MCNC: 97.8 MG/DL
LYMPHOCYTES # BLD: 1.5 K/UL (ref 0.5–4.6)
LYMPHOCYTES NFR BLD: 26 % (ref 13–44)
MCH RBC QN AUTO: 30 PG (ref 26.1–32.9)
MCHC RBC AUTO-ENTMCNC: 32.1 G/DL (ref 31.4–35)
MCV RBC AUTO: 93.3 FL (ref 82–102)
MICROALBUMIN UR-MCNC: 0.6 MG/DL
MICROALBUMIN/CREAT UR-RTO: 20 MG/G (ref 0–30)
MONOCYTES # BLD: 0.6 K/UL (ref 0.1–1.3)
MONOCYTES NFR BLD: 9 % (ref 4–12)
NEUTS SEG # BLD: 3.6 K/UL (ref 1.7–8.2)
NEUTS SEG NFR BLD: 60 % (ref 43–78)
NRBC # BLD: 0 K/UL (ref 0–0.2)
PLATELET # BLD AUTO: 247 K/UL (ref 150–450)
PMV BLD AUTO: 10.4 FL (ref 9.4–12.3)
POTASSIUM SERPL-SCNC: 4.8 MMOL/L (ref 3.5–5.1)
PROT SERPL-MCNC: 7.6 G/DL (ref 6.3–8.2)
RBC # BLD AUTO: 4.47 M/UL (ref 4.23–5.6)
SODIUM SERPL-SCNC: 137 MMOL/L (ref 133–143)
TRIGL SERPL-MCNC: 51 MG/DL (ref 35–150)
TSH W FREE THYROID IF ABNORMAL: 3.51 UIU/ML (ref 0.36–3.74)
VLDLC SERPL CALC-MCNC: 10.2 MG/DL (ref 6–23)
WBC # BLD AUTO: 5.9 K/UL (ref 4.3–11.1)

## 2023-11-02 RX ORDER — PROCHLORPERAZINE 25 MG/1
SUPPOSITORY RECTAL
Qty: 9 EACH | Refills: 3 | Status: SHIPPED | OUTPATIENT
Start: 2023-11-02

## 2023-11-02 RX ORDER — INSULIN PMP CART,AUT,G6/7,CNTR
EACH SUBCUTANEOUS
Qty: 30 EACH | Refills: 3 | Status: SHIPPED | OUTPATIENT
Start: 2023-11-02

## 2023-11-02 RX ORDER — PROCHLORPERAZINE 25 MG/1
SUPPOSITORY RECTAL
Qty: 1 EACH | Refills: 3 | Status: SHIPPED | OUTPATIENT
Start: 2023-11-02

## 2023-11-13 ENCOUNTER — FOLLOWUP TELEPHONE ENCOUNTER (OUTPATIENT)
Dept: DIABETES SERVICES | Age: 53
End: 2023-11-13

## 2023-11-13 NOTE — TELEPHONE ENCOUNTER
No show for type 1 diabetes nutrition one class today. Will not call due to pt was called last week to remind him of class. Cancelled second class due to needing first class before the second.

## 2024-02-06 ENCOUNTER — OFFICE VISIT (OUTPATIENT)
Dept: ENDOCRINOLOGY | Age: 54
End: 2024-02-06
Payer: COMMERCIAL

## 2024-02-06 VITALS
BODY MASS INDEX: 31.58 KG/M2 | RESPIRATION RATE: 16 BRPM | HEIGHT: 64 IN | WEIGHT: 185 LBS | OXYGEN SATURATION: 98 % | SYSTOLIC BLOOD PRESSURE: 152 MMHG | HEART RATE: 84 BPM | DIASTOLIC BLOOD PRESSURE: 88 MMHG

## 2024-02-06 DIAGNOSIS — E55.9 VITAMIN D DEFICIENCY: ICD-10-CM

## 2024-02-06 DIAGNOSIS — E10.40 TYPE 1 DIABETES MELLITUS WITH DIABETIC NEUROPATHY (HCC): Primary | ICD-10-CM

## 2024-02-06 DIAGNOSIS — E78.00 HYPERCHOLESTEROLEMIA: ICD-10-CM

## 2024-02-06 DIAGNOSIS — Z96.41 INSULIN PUMP IN PLACE: ICD-10-CM

## 2024-02-06 DIAGNOSIS — I10 PRIMARY HYPERTENSION: ICD-10-CM

## 2024-02-06 DIAGNOSIS — E10.3393 MODERATE NONPROLIFERATIVE DIABETIC RETINOPATHY OF BOTH EYES WITHOUT MACULAR EDEMA ASSOCIATED WITH TYPE 1 DIABETES MELLITUS (HCC): ICD-10-CM

## 2024-02-06 LAB — HBA1C MFR BLD: 7.4 %

## 2024-02-06 PROCEDURE — 83036 HEMOGLOBIN GLYCOSYLATED A1C: CPT | Performed by: PHYSICIAN ASSISTANT

## 2024-02-06 PROCEDURE — 99214 OFFICE O/P EST MOD 30 MIN: CPT | Performed by: PHYSICIAN ASSISTANT

## 2024-02-06 PROCEDURE — 3077F SYST BP >= 140 MM HG: CPT | Performed by: PHYSICIAN ASSISTANT

## 2024-02-06 PROCEDURE — 95251 CONT GLUC MNTR ANALYSIS I&R: CPT | Performed by: PHYSICIAN ASSISTANT

## 2024-02-06 PROCEDURE — 3079F DIAST BP 80-89 MM HG: CPT | Performed by: PHYSICIAN ASSISTANT

## 2024-02-06 NOTE — PROGRESS NOTES
02/17/2022    HDL 80 02/04/2021      Lab Results   Component Value Date    HDL 83 (H) 11/02/2023    HDL 65 02/17/2022    HDL 80 02/04/2021      Lab Results   Component Value Date    TRIG 51 11/02/2023    TRIG 61 02/17/2022    TRIG 49 02/04/2021     Lab Results   Component Value Date    CHOLHDLRATIO 2.3 11/02/2023         Hemoglobin A1c:  09/05/2017      7.7%   12/05/2017      9.8%   03/05/2018       7.6%   06/13/2018      8.2%   09/14/2018      6.9%   01/02/2019      6.9%   04/02/2019      6.7%   07/01/2019      6.7%   10/07/2019      7.3%   01/07/2020      6.8%   06/18/2020      6.7%   10/29/2020      6.7%   02/04/2021          7.3%   05/11/2021          7.4%    08/31/2021              7.2%    02/17/2022             7.6%  06/24/2022              7.8%  10/27/2022  7.8%  02/20/2023  8.0%  06/20/2023  7.1%  11/02/2023  7.3%  02/06/2024  7.4%  Hemoglobin A1C, POC   Date Value Ref Range Status   02/06/2024 7.4 % Final   11/02/2023 7.3 % Final   06/20/2023 7.1 % Final        Thyroid:   06/13/2018      2.670               01/02/2019      4.10               07/02/2019      3.510               01/07/2020      2.340                   02/04/2021      2.650    11/02/2023 3.51      Lab Results   Component Value Date/Time    TSH 2.360 02/17/2022 09:38 AM    TSH 2.650 02/04/2021 09:38 AM    TSH 2.340 01/07/2020 09:35 AM     Vitamin D         Reviewed and updated this visit by provider:  Tobacco  Allergies  Meds  Problems  Med Hx  Surg Hx  Fam Hx                    Allergies & Medications:  Reviewed in chart.        Review of Systems    Vital Signs:  BP (!) 152/88   Pulse 84   Resp 16   Ht 1.626 m (5' 4\")   Wt 83.9 kg (185 lb)   SpO2 98%   BMI 31.76 kg/m²       Physical Exam  Constitutional:       Appearance: Normal appearance.   Neck:      Thyroid: No thyromegaly.   Cardiovascular:      Rate and Rhythm: Normal rate and regular rhythm.   Pulmonary:      Effort: Pulmonary effort is normal.      Breath sounds: Normal

## 2024-02-23 ENCOUNTER — OFFICE VISIT (OUTPATIENT)
Dept: FAMILY MEDICINE CLINIC | Facility: CLINIC | Age: 54
End: 2024-02-23

## 2024-02-23 VITALS
SYSTOLIC BLOOD PRESSURE: 156 MMHG | DIASTOLIC BLOOD PRESSURE: 82 MMHG | RESPIRATION RATE: 16 BRPM | HEART RATE: 69 BPM | WEIGHT: 181 LBS | OXYGEN SATURATION: 97 % | TEMPERATURE: 97.3 F | BODY MASS INDEX: 34.17 KG/M2 | HEIGHT: 61 IN

## 2024-02-23 DIAGNOSIS — Z96.41 INSULIN PUMP IN PLACE: ICD-10-CM

## 2024-02-23 DIAGNOSIS — J45.20 MILD INTERMITTENT ASTHMA WITHOUT COMPLICATION: ICD-10-CM

## 2024-02-23 DIAGNOSIS — E10.9 CONTROLLED TYPE 1 DIABETES MELLITUS, WITH LONG-TERM CURRENT USE OF INSULIN (HCC): ICD-10-CM

## 2024-02-23 DIAGNOSIS — E10.3393 MODERATE NONPROLIFERATIVE DIABETIC RETINOPATHY OF BOTH EYES WITHOUT MACULAR EDEMA ASSOCIATED WITH TYPE 1 DIABETES MELLITUS (HCC): ICD-10-CM

## 2024-02-23 DIAGNOSIS — I10 PRIMARY HYPERTENSION: ICD-10-CM

## 2024-02-23 DIAGNOSIS — E10.649 HYPOGLYCEMIA DUE TO TYPE 1 DIABETES MELLITUS (HCC): ICD-10-CM

## 2024-02-23 DIAGNOSIS — Z00.00 ROUTINE GENERAL MEDICAL EXAMINATION AT A HEALTH CARE FACILITY: Primary | ICD-10-CM

## 2024-02-23 DIAGNOSIS — Z12.11 SCREENING FOR MALIGNANT NEOPLASM OF COLON: ICD-10-CM

## 2024-02-23 DIAGNOSIS — E78.00 HYPERCHOLESTEROLEMIA: ICD-10-CM

## 2024-02-23 DIAGNOSIS — E10.40 TYPE 1 DIABETES MELLITUS WITH DIABETIC NEUROPATHY (HCC): ICD-10-CM

## 2024-02-23 PROBLEM — R03.0 ELEVATED BLOOD PRESSURE READING: Status: RESOLVED | Noted: 2018-09-14 | Resolved: 2024-02-23

## 2024-02-23 RX ORDER — LOSARTAN POTASSIUM 50 MG/1
50 TABLET ORAL DAILY
Qty: 90 TABLET | Refills: 3 | Status: SHIPPED | OUTPATIENT
Start: 2024-02-23

## 2024-02-23 ASSESSMENT — PATIENT HEALTH QUESTIONNAIRE - PHQ9
SUM OF ALL RESPONSES TO PHQ9 QUESTIONS 1 & 2: 0
1. LITTLE INTEREST OR PLEASURE IN DOING THINGS: 0
2. FEELING DOWN, DEPRESSED OR HOPELESS: 0
SUM OF ALL RESPONSES TO PHQ QUESTIONS 1-9: 0

## 2024-02-23 NOTE — PROGRESS NOTES
[facility] [place] [address]  Phone: [phone] Fax [phone]  Melva Fay MD             Mr. Marshall is a 53 y.o.  y.o.  year old  male patient who comes in for complete physical. He has been doing well. No fatigue, weight loss, weight gain. No shortness of breath, chest pain, palpitations. No abdominal pain, diarrhea, or constipation. No abnormal skin changes. Has  been working on diet and exercise. Feels well.      ROS:  Feeling well. No dyspnea or chest pain on exertion.  No abdominal pain, change in bowel habits, black or bloody stools.      Last Colonoscopy: None    Patient does have type 1 diabetes and sees endocrinology and is on an insulin pump.  His last A1c was 7.4.  I did have blood work done in November and it was good.    He does struggle some with left carpal tunnels and is considering surgery.    Mr. Marshall  has a past medical history of Asthma, History of seasonal allergies, and Type 1 diabetes mellitus (HCC).    Mr. Marshall  has no past surgical history on file.    Mr. Marshall   Current Outpatient Medications   Medication Sig Dispense Refill    losartan (COZAAR) 50 MG tablet Take 1 tablet by mouth daily 90 tablet 3    Insulin Infusion Pump MAILE Pump settings: OMNIPOD 5 standard pattern- 24 hour total 19.2 units Time MN  0.8 Carb Ratio MN  10 Insulin Sensitivity 40 Target low  , 7a 120, 7pm 110 correct above  7a 150 7p 140 Active Insulin time 3:30 Max Bolus 15 units 1 each 0    Continuous Blood Gluc Transmit (DEXCOM G6 TRANSMITTER) MISC USE TO MONITOR BLOOD GLUCOSE, E10.65 1 each 3    Continuous Blood Gluc Sensor (DEXCOM G6 SENSOR) MISC USE TO MONITOR BLOOD GLUCOSE, E10.65 9 each 3    Insulin Disposable Pump (OMNIPOD 5 G6 POD, GEN 5,) MISC Use to deliver insulin, E10.65 30 each 3    atorvastatin (LIPITOR) 10 MG tablet Take 1 tablet by mouth daily 90 tablet 3    HUMALOG 100 UNIT/ML SOLN injection vial Use with insulin pump, max

## 2024-04-01 DIAGNOSIS — E10.40 TYPE 1 DIABETES MELLITUS WITH DIABETIC NEUROPATHY (HCC): ICD-10-CM

## 2024-04-01 RX ORDER — INSULIN LISPRO 100 [IU]/ML
INJECTION, SOLUTION INTRAVENOUS; SUBCUTANEOUS
Qty: 70 ML | Refills: 3 | Status: SHIPPED | OUTPATIENT
Start: 2024-04-01

## 2024-04-01 NOTE — PROGRESS NOTES
Patient states Faye is trying to reach us about his humalog RX  I do not find that pharmacy in the system  RX printed    Can be faxed to [phone]

## 2024-04-04 ENCOUNTER — TELEPHONE (OUTPATIENT)
Dept: ENDOCRINOLOGY | Age: 54
End: 2024-04-04

## 2024-05-16 ENCOUNTER — PREP FOR PROCEDURE (OUTPATIENT)
Dept: GASTROENTEROLOGY | Age: 54
End: 2024-05-16

## 2024-05-16 ENCOUNTER — TELEPHONE (OUTPATIENT)
Dept: GASTROENTEROLOGY | Age: 54
End: 2024-05-16

## 2024-05-16 DIAGNOSIS — Z12.11 COLON CANCER SCREENING: ICD-10-CM

## 2024-05-22 RX ORDER — SODIUM CHLORIDE 0.9 % (FLUSH) 0.9 %
5-40 SYRINGE (ML) INJECTION PRN
Status: CANCELLED | OUTPATIENT
Start: 2024-05-22

## 2024-05-22 RX ORDER — SODIUM CHLORIDE 9 MG/ML
25 INJECTION, SOLUTION INTRAVENOUS PRN
Status: CANCELLED | OUTPATIENT
Start: 2024-05-22

## 2024-05-22 RX ORDER — SODIUM CHLORIDE 0.9 % (FLUSH) 0.9 %
5-40 SYRINGE (ML) INJECTION EVERY 12 HOURS SCHEDULED
Status: CANCELLED | OUTPATIENT
Start: 2024-05-22

## 2024-06-12 ENCOUNTER — OFFICE VISIT (OUTPATIENT)
Dept: ENDOCRINOLOGY | Age: 54
End: 2024-06-12
Payer: COMMERCIAL

## 2024-06-12 VITALS
BODY MASS INDEX: 30.83 KG/M2 | SYSTOLIC BLOOD PRESSURE: 122 MMHG | OXYGEN SATURATION: 97 % | HEIGHT: 64 IN | WEIGHT: 180.6 LBS | HEART RATE: 78 BPM | DIASTOLIC BLOOD PRESSURE: 82 MMHG

## 2024-06-12 DIAGNOSIS — E55.9 VITAMIN D DEFICIENCY: ICD-10-CM

## 2024-06-12 DIAGNOSIS — Z96.41 INSULIN PUMP IN PLACE: ICD-10-CM

## 2024-06-12 DIAGNOSIS — E10.3393 MODERATE NONPROLIFERATIVE DIABETIC RETINOPATHY OF BOTH EYES WITHOUT MACULAR EDEMA ASSOCIATED WITH TYPE 1 DIABETES MELLITUS (HCC): ICD-10-CM

## 2024-06-12 DIAGNOSIS — E10.40 TYPE 1 DIABETES MELLITUS WITH DIABETIC NEUROPATHY (HCC): ICD-10-CM

## 2024-06-12 DIAGNOSIS — I10 PRIMARY HYPERTENSION: ICD-10-CM

## 2024-06-12 DIAGNOSIS — E78.00 HYPERCHOLESTEROLEMIA: ICD-10-CM

## 2024-06-12 DIAGNOSIS — E10.40 TYPE 1 DIABETES MELLITUS WITH DIABETIC NEUROPATHY (HCC): Primary | ICD-10-CM

## 2024-06-12 LAB
25(OH)D3 SERPL-MCNC: 34.4 NG/ML (ref 30–100)
ALBUMIN SERPL-MCNC: 4.2 G/DL (ref 3.5–5)
ALBUMIN/GLOB SERPL: 1.3 (ref 1–1.9)
ALP SERPL-CCNC: 83 U/L (ref 40–129)
ALT SERPL-CCNC: 37 U/L (ref 12–65)
ANION GAP SERPL CALC-SCNC: 10 MMOL/L (ref 9–18)
AST SERPL-CCNC: 35 U/L (ref 15–37)
BASOPHILS # BLD: 0.1 K/UL (ref 0–0.2)
BASOPHILS NFR BLD: 1 % (ref 0–2)
BILIRUB SERPL-MCNC: 0.4 MG/DL (ref 0–1.2)
BUN SERPL-MCNC: 11 MG/DL (ref 6–23)
CALCIUM SERPL-MCNC: 9.7 MG/DL (ref 8.8–10.2)
CHLORIDE SERPL-SCNC: 103 MMOL/L (ref 98–107)
CHOLEST SERPL-MCNC: 190 MG/DL (ref 0–200)
CO2 SERPL-SCNC: 29 MMOL/L (ref 20–28)
CREAT SERPL-MCNC: 1.17 MG/DL (ref 0.8–1.3)
CREAT UR-MCNC: 25.6 MG/DL (ref 39–259)
DIFFERENTIAL METHOD BLD: ABNORMAL
EOSINOPHIL # BLD: 0.2 K/UL (ref 0–0.8)
EOSINOPHIL NFR BLD: 4 % (ref 0.5–7.8)
ERYTHROCYTE [DISTWIDTH] IN BLOOD BY AUTOMATED COUNT: 12.6 % (ref 11.9–14.6)
GLOBULIN SER CALC-MCNC: 3.3 G/DL (ref 2.3–3.5)
GLUCOSE SERPL-MCNC: 146 MG/DL (ref 70–99)
HBA1C MFR BLD: 7.3 %
HCT VFR BLD AUTO: 40 % (ref 41.1–50.3)
HDLC SERPL-MCNC: 71 MG/DL (ref 40–60)
HDLC SERPL: 2.7 (ref 0–5)
HGB BLD-MCNC: 13.3 G/DL (ref 13.6–17.2)
IMM GRANULOCYTES # BLD AUTO: 0 K/UL (ref 0–0.5)
IMM GRANULOCYTES NFR BLD AUTO: 0 % (ref 0–5)
LDLC SERPL CALC-MCNC: 109 MG/DL (ref 0–100)
LYMPHOCYTES # BLD: 1.3 K/UL (ref 0.5–4.6)
LYMPHOCYTES NFR BLD: 29 % (ref 13–44)
MCH RBC QN AUTO: 31 PG (ref 26.1–32.9)
MCHC RBC AUTO-ENTMCNC: 33.3 G/DL (ref 31.4–35)
MCV RBC AUTO: 93.2 FL (ref 82–102)
MICROALBUMIN UR-MCNC: <1.2 MG/DL (ref 0–20)
MICROALBUMIN/CREAT UR-RTO: ABNORMAL MG/G (ref 0–30)
MONOCYTES # BLD: 0.5 K/UL (ref 0.1–1.3)
MONOCYTES NFR BLD: 11 % (ref 4–12)
NEUTS SEG # BLD: 2.5 K/UL (ref 1.7–8.2)
NEUTS SEG NFR BLD: 55 % (ref 43–78)
NRBC # BLD: 0 K/UL (ref 0–0.2)
PLATELET # BLD AUTO: 221 K/UL (ref 150–450)
PMV BLD AUTO: 10.3 FL (ref 9.4–12.3)
POTASSIUM SERPL-SCNC: 5.3 MMOL/L (ref 3.5–5.1)
PROT SERPL-MCNC: 7.5 G/DL (ref 6.3–8.2)
RBC # BLD AUTO: 4.29 M/UL (ref 4.23–5.6)
SODIUM SERPL-SCNC: 141 MMOL/L (ref 136–145)
TRIGL SERPL-MCNC: 49 MG/DL (ref 0–150)
TSH W FREE THYROID IF ABNORMAL: 3.46 UIU/ML (ref 0.27–4.2)
VLDLC SERPL CALC-MCNC: 10 MG/DL (ref 6–23)
WBC # BLD AUTO: 4.6 K/UL (ref 4.3–11.1)

## 2024-06-12 PROCEDURE — 3074F SYST BP LT 130 MM HG: CPT | Performed by: PHYSICIAN ASSISTANT

## 2024-06-12 PROCEDURE — 83036 HEMOGLOBIN GLYCOSYLATED A1C: CPT | Performed by: PHYSICIAN ASSISTANT

## 2024-06-12 PROCEDURE — 99214 OFFICE O/P EST MOD 30 MIN: CPT | Performed by: PHYSICIAN ASSISTANT

## 2024-06-12 PROCEDURE — 95251 CONT GLUC MNTR ANALYSIS I&R: CPT | Performed by: PHYSICIAN ASSISTANT

## 2024-06-12 PROCEDURE — 3079F DIAST BP 80-89 MM HG: CPT | Performed by: PHYSICIAN ASSISTANT

## 2024-06-12 RX ORDER — ASPIRIN 81 MG
1 TABLET, DELAYED RELEASE (ENTERIC COATED) ORAL DAILY
COMMUNITY

## 2024-06-12 RX ORDER — ASPIRIN 81 MG/1
81 TABLET, CHEWABLE ORAL DAILY
COMMUNITY

## 2024-06-12 NOTE — PROGRESS NOTES
[facility] ENDOCRINOLOGY   AND   THYROID NODULE CLINIC    Kylie Frias PA-C  [facility] Endocrinology and Thyroid Nodule Clinic  [address] [facility], [facility] [address] [address]  Phone [phone]  Facsimile [phone]          Linwood Marshall is a 53 y.o. male seen 6/12/2024 for follow up evaluation of type 1 diabetes on insulin pump        Assessment and Plan:        Interpretation of 72 hour glucose monitor:  At least 72 hours of data were reviewed.  The patient utilizes a dexcom G6 continuous glucose monitoring system.  The average glucose during the reviewed timeframe was 169 with a standard deviation of 63.  There is a pattern of frequent post prandial hyperglycemia        1. Type 1 diabetes mellitus with diabetic neuropathy (HCC)  Glycemic control is stable but suboptimal   Good pump habits, better overall medication compliance  Occasional lows associated with activity and not using activity mode       Given patient HX and episode of chest tightness with exertion, we will refer to cardiology for evaluation. Thank you in advance.   - AMB POC HEMOGLOBIN A1C  - Vitamin D 25 Hydroxy; Future  - Comprehensive Metabolic Panel; Future  - CBC with Auto Differential; Future  - Microalbumin / Creatinine Urine Ratio; Future  - Lipid Panel; Future  - TSH with Reflex; Future  - Insulin Infusion Pump MAILE; Pump settings: OMNIPOD 5 standard pattern- 24 hour total 19.2 units Time MN  0.8 Carb Ratio MN  9.5 Insulin Sensitivity 40 Target low  , 7a 120, 7pm 110 correct above  7a 150 7p 140 Active Insulin time 3:00 Max Bolus 15 units  Dispense: 1 each; Refill: 0  - NE CONTINUOUS GLUCOSE MONITORING ANALYSIS I&R  - HM DIABETES FOOT EXAM  - [facility]    2. Insulin pump in place  Reduce ICR from 10 to 9.5  Reduce IOB from 3.5 to 3  Notify office if hypoglycemia develops   - Insulin Infusion Pump MAILE; Pump settings: OMNIPOD 5 standard pattern- 24 hour total 19.2 units Time

## 2024-06-15 PROBLEM — Z12.11 COLON CANCER SCREENING: Status: RESOLVED | Noted: 2024-05-16 | Resolved: 2024-06-15

## 2024-06-17 ENCOUNTER — TELEPHONE (OUTPATIENT)
Dept: ENDOCRINOLOGY | Age: 54
End: 2024-06-17

## 2024-06-17 DIAGNOSIS — E78.00 HYPERCHOLESTEROLEMIA: Primary | ICD-10-CM

## 2024-06-17 RX ORDER — ATORVASTATIN CALCIUM 20 MG/1
20 TABLET, FILM COATED ORAL DAILY
Qty: 90 TABLET | Refills: 1 | Status: SHIPPED | OUTPATIENT
Start: 2024-06-17

## 2024-06-17 NOTE — TELEPHONE ENCOUNTER
myChart response:        As discussed, increase atorvastatin from 10mg to 20mg. Slow increase due to history of elevated LFTs on rosuvastatin.    I have also referred you to cardiology after our discussion last week    ~Kylie

## 2024-07-05 ENCOUNTER — OFFICE VISIT (OUTPATIENT)
Dept: FAMILY MEDICINE CLINIC | Facility: CLINIC | Age: 54
End: 2024-07-05
Payer: COMMERCIAL

## 2024-07-05 VITALS
TEMPERATURE: 97.2 F | DIASTOLIC BLOOD PRESSURE: 77 MMHG | SYSTOLIC BLOOD PRESSURE: 114 MMHG | HEIGHT: 61 IN | BODY MASS INDEX: 33.61 KG/M2 | WEIGHT: 178 LBS | HEART RATE: 69 BPM | RESPIRATION RATE: 16 BRPM | OXYGEN SATURATION: 97 %

## 2024-07-05 DIAGNOSIS — I10 PRIMARY HYPERTENSION: Primary | ICD-10-CM

## 2024-07-05 PROCEDURE — 3074F SYST BP LT 130 MM HG: CPT | Performed by: FAMILY MEDICINE

## 2024-07-05 PROCEDURE — 99213 OFFICE O/P EST LOW 20 MIN: CPT | Performed by: FAMILY MEDICINE

## 2024-07-05 PROCEDURE — 3078F DIAST BP <80 MM HG: CPT | Performed by: FAMILY MEDICINE

## 2024-07-05 RX ORDER — LOSARTAN POTASSIUM 50 MG/1
50 TABLET ORAL DAILY
Qty: 90 TABLET | Refills: 3 | Status: SHIPPED | OUTPATIENT
Start: 2024-07-05

## 2024-07-05 SDOH — ECONOMIC STABILITY: FOOD INSECURITY: WITHIN THE PAST 12 MONTHS, YOU WORRIED THAT YOUR FOOD WOULD RUN OUT BEFORE YOU GOT MONEY TO BUY MORE.: NEVER TRUE

## 2024-07-05 SDOH — ECONOMIC STABILITY: FOOD INSECURITY: WITHIN THE PAST 12 MONTHS, THE FOOD YOU BOUGHT JUST DIDN'T LAST AND YOU DIDN'T HAVE MONEY TO GET MORE.: NEVER TRUE

## 2024-07-05 SDOH — ECONOMIC STABILITY: INCOME INSECURITY: HOW HARD IS IT FOR YOU TO PAY FOR THE VERY BASICS LIKE FOOD, HOUSING, MEDICAL CARE, AND HEATING?: NOT VERY HARD

## 2024-07-05 ASSESSMENT — PATIENT HEALTH QUESTIONNAIRE - PHQ9
SUM OF ALL RESPONSES TO PHQ QUESTIONS 1-9: 0
SUM OF ALL RESPONSES TO PHQ9 QUESTIONS 1 & 2: 0
1. LITTLE INTEREST OR PLEASURE IN DOING THINGS: NOT AT ALL
2. FEELING DOWN, DEPRESSED OR HOPELESS: NOT AT ALL
SUM OF ALL RESPONSES TO PHQ QUESTIONS 1-9: 0

## 2024-07-05 NOTE — PROGRESS NOTES
Violence: Not on file   Housing Stability: Unknown (7/5/2024)    Housing Stability Vital Sign     Unable to Pay for Housing in the Last Year: Not on file     Number of Places Lived in the Last Year: Not on file     Unstable Housing in the Last Year: No       Allergies   Allergen Reactions    Other Swelling     shrimp  shrimp      Shellfish Allergy     Azithromycin Nausea And Vomiting       /77   Pulse 69   Temp 97.2 °F (36.2 °C)   Resp 16   Ht 1.549 m (5' 1\")   Wt 80.7 kg (178 lb)   SpO2 97%   BMI 33.63 kg/m²     HEENT: Normocephalic, atraumatic, pupils equal and reactive to light.   Neck: Supple, no masses or thyromegaly.  Lungs: clear to auscultation bilaterally.  CV: regular rate and rhythm, without murmurs, rubs, or gallops  Ext: No lower extremity edema.    Linwood was seen today for hypertension.    Diagnoses and all orders for this visit:    Primary hypertension  -     losartan (COZAAR) 50 MG tablet; Take 1 tablet by mouth daily      Continue losartan 50 mg  Monitor blood pressure carefully.  If he has any symptoms of low blood pressure was went over today.  He will call.  Be sure to stay well-hydrated and watch his heat exposure.  Follow-up with endocrinology and cardiology as previously scheduled.  Melva Fay MD

## 2024-07-15 ENCOUNTER — INITIAL CONSULT (OUTPATIENT)
Age: 54
End: 2024-07-15
Payer: COMMERCIAL

## 2024-07-15 VITALS
HEIGHT: 64 IN | WEIGHT: 181 LBS | HEART RATE: 70 BPM | DIASTOLIC BLOOD PRESSURE: 70 MMHG | BODY MASS INDEX: 30.9 KG/M2 | SYSTOLIC BLOOD PRESSURE: 128 MMHG

## 2024-07-15 DIAGNOSIS — I10 PRIMARY HYPERTENSION: Primary | ICD-10-CM

## 2024-07-15 DIAGNOSIS — R00.2 HEART PALPITATIONS: ICD-10-CM

## 2024-07-15 PROCEDURE — 3078F DIAST BP <80 MM HG: CPT | Performed by: INTERNAL MEDICINE

## 2024-07-15 PROCEDURE — 99204 OFFICE O/P NEW MOD 45 MIN: CPT | Performed by: INTERNAL MEDICINE

## 2024-07-15 PROCEDURE — 93000 ELECTROCARDIOGRAM COMPLETE: CPT | Performed by: INTERNAL MEDICINE

## 2024-07-15 PROCEDURE — 3074F SYST BP LT 130 MM HG: CPT | Performed by: INTERNAL MEDICINE

## 2024-07-15 ASSESSMENT — ENCOUNTER SYMPTOMS
ABDOMINAL PAIN: 0
SHORTNESS OF BREATH: 0

## 2024-07-15 NOTE — PROGRESS NOTES
Normal appearance.   HENT:      Head: Normocephalic and atraumatic.   Eyes:      General: No scleral icterus.  Neck:      Vascular: No carotid bruit.   Cardiovascular:      Rate and Rhythm: Normal rate and regular rhythm.      Heart sounds: No murmur heard.  Pulmonary:      Breath sounds: Normal breath sounds.   Abdominal:      General: Abdomen is flat.      Palpations: Abdomen is soft.   Musculoskeletal:         General: No swelling.      Cervical back: Neck supple.   Skin:     General: Skin is warm and dry.   Neurological:      Mental Status: He is alert and oriented to person, place, and time.   Psychiatric:         Mood and Affect: Mood normal.         Medical problems and test results were reviewed with the patient today.     DATA REVIEW    LIPID PANEL     Lab Results   Component Value Date    CHOL 190 06/12/2024    CHOL 191 11/02/2023    CHOL 157 02/17/2022     Lab Results   Component Value Date    TRIG 49 06/12/2024    TRIG 51 11/02/2023    TRIG 61 02/17/2022     Lab Results   Component Value Date    HDL 71 (H) 06/12/2024    HDL 83 (H) 11/02/2023    HDL 65 02/17/2022     No components found for: \"LDLCHOLESTEROL\", \"LDLCALC\"  Lab Results   Component Value Date    VLDL 10 06/12/2024    VLDL 10.2 11/02/2023    VLDL 12 02/17/2022     Lab Results   Component Value Date    CHOLHDLRATIO 2.7 06/12/2024    CHOLHDLRATIO 2.3 11/02/2023       BMP  Lab Results   Component Value Date/Time     06/12/2024 09:17 AM    K 5.3 06/12/2024 09:17 AM     06/12/2024 09:17 AM    CO2 29 06/12/2024 09:17 AM    BUN 11 06/12/2024 09:17 AM    CREATININE 1.17 06/12/2024 09:17 AM    GLUCOSE 146 06/12/2024 09:17 AM    CALCIUM 9.7 06/12/2024 09:17 AM          EKG  Sinus rhythm      OUTSIDE RECORDS REVIEW    Records from outside providers have been reviewed and summarized as noted in the HPI, past history and data review sections of this note, and reviewed with patient. .       ASSESSMENT and PLAN    Linwood was seen today for

## 2025-01-22 ENCOUNTER — OFFICE VISIT (OUTPATIENT)
Dept: ENDOCRINOLOGY | Age: 55
End: 2025-01-22
Payer: COMMERCIAL

## 2025-01-22 ENCOUNTER — TELEPHONE (OUTPATIENT)
Dept: ENDOCRINOLOGY | Age: 55
End: 2025-01-22

## 2025-01-22 VITALS
SYSTOLIC BLOOD PRESSURE: 122 MMHG | HEART RATE: 77 BPM | BODY MASS INDEX: 33.6 KG/M2 | DIASTOLIC BLOOD PRESSURE: 82 MMHG | OXYGEN SATURATION: 96 % | WEIGHT: 182.6 LBS | HEIGHT: 62 IN

## 2025-01-22 DIAGNOSIS — E55.9 VITAMIN D DEFICIENCY: ICD-10-CM

## 2025-01-22 DIAGNOSIS — E10.3393 MODERATE NONPROLIFERATIVE DIABETIC RETINOPATHY OF BOTH EYES WITHOUT MACULAR EDEMA ASSOCIATED WITH TYPE 1 DIABETES MELLITUS (HCC): ICD-10-CM

## 2025-01-22 DIAGNOSIS — I10 PRIMARY HYPERTENSION: ICD-10-CM

## 2025-01-22 DIAGNOSIS — E10.40 TYPE 1 DIABETES MELLITUS WITH DIABETIC NEUROPATHY (HCC): ICD-10-CM

## 2025-01-22 DIAGNOSIS — E10.40 TYPE 1 DIABETES MELLITUS WITH DIABETIC NEUROPATHY (HCC): Primary | ICD-10-CM

## 2025-01-22 DIAGNOSIS — Z96.41 INSULIN PUMP IN PLACE: ICD-10-CM

## 2025-01-22 DIAGNOSIS — E78.00 HYPERCHOLESTEROLEMIA: ICD-10-CM

## 2025-01-22 DIAGNOSIS — E55.9 VITAMIN D DEFICIENCY: Primary | ICD-10-CM

## 2025-01-22 LAB
25(OH)D3 SERPL-MCNC: 25.5 NG/ML (ref 30–100)
ALBUMIN SERPL-MCNC: 4 G/DL (ref 3.5–5)
ALBUMIN/GLOB SERPL: 1.1 (ref 1–1.9)
ALP SERPL-CCNC: 94 U/L (ref 40–129)
ALT SERPL-CCNC: 46 U/L (ref 8–55)
ANION GAP SERPL CALC-SCNC: 10 MMOL/L (ref 7–16)
AST SERPL-CCNC: 34 U/L (ref 15–37)
BASOPHILS # BLD: 0.05 K/UL (ref 0–0.2)
BASOPHILS NFR BLD: 1 % (ref 0–2)
BILIRUB SERPL-MCNC: 0.3 MG/DL (ref 0–1.2)
BUN SERPL-MCNC: 20 MG/DL (ref 6–23)
CALCIUM SERPL-MCNC: 9.8 MG/DL (ref 8.8–10.2)
CHLORIDE SERPL-SCNC: 101 MMOL/L (ref 98–107)
CHOLEST SERPL-MCNC: 167 MG/DL (ref 0–200)
CO2 SERPL-SCNC: 29 MMOL/L (ref 20–29)
CREAT SERPL-MCNC: 1.3 MG/DL (ref 0.8–1.3)
CREAT UR-MCNC: 115 MG/DL (ref 39–259)
DIFFERENTIAL METHOD BLD: ABNORMAL
EOSINOPHIL # BLD: 0.22 K/UL (ref 0–0.8)
EOSINOPHIL NFR BLD: 4.4 % (ref 0.5–7.8)
ERYTHROCYTE [DISTWIDTH] IN BLOOD BY AUTOMATED COUNT: 12.7 % (ref 11.9–14.6)
GLOBULIN SER CALC-MCNC: 3.5 G/DL (ref 2.3–3.5)
GLUCOSE SERPL-MCNC: 113 MG/DL (ref 70–99)
HBA1C MFR BLD: 7.4 %
HCT VFR BLD AUTO: 42.1 % (ref 41.1–50.3)
HDLC SERPL-MCNC: 77 MG/DL (ref 40–60)
HDLC SERPL: 2.2 (ref 0–5)
HGB BLD-MCNC: 14 G/DL (ref 13.6–17.2)
IMM GRANULOCYTES # BLD AUTO: 0.01 K/UL (ref 0–0.5)
IMM GRANULOCYTES NFR BLD AUTO: 0.2 % (ref 0–5)
LDLC SERPL CALC-MCNC: 80 MG/DL (ref 0–100)
LYMPHOCYTES # BLD: 1.43 K/UL (ref 0.5–4.6)
LYMPHOCYTES NFR BLD: 28.9 % (ref 13–44)
MCH RBC QN AUTO: 30.4 PG (ref 26.1–32.9)
MCHC RBC AUTO-ENTMCNC: 33.3 G/DL (ref 31.4–35)
MCV RBC AUTO: 91.5 FL (ref 82–102)
MICROALBUMIN UR-MCNC: <1.2 MG/DL (ref 0–20)
MICROALBUMIN/CREAT UR-RTO: NORMAL MG/G (ref 0–30)
MONOCYTES # BLD: 0.64 K/UL (ref 0.1–1.3)
MONOCYTES NFR BLD: 12.9 % (ref 4–12)
NEUTS SEG # BLD: 2.6 K/UL (ref 1.7–8.2)
NEUTS SEG NFR BLD: 52.6 % (ref 43–78)
NRBC # BLD: 0 K/UL (ref 0–0.2)
PLATELET # BLD AUTO: 269 K/UL (ref 150–450)
PMV BLD AUTO: 10.2 FL (ref 9.4–12.3)
POTASSIUM SERPL-SCNC: 5.1 MMOL/L (ref 3.5–5.1)
PROT SERPL-MCNC: 7.5 G/DL (ref 6.3–8.2)
RBC # BLD AUTO: 4.6 M/UL (ref 4.23–5.6)
SODIUM SERPL-SCNC: 140 MMOL/L (ref 136–145)
TRIGL SERPL-MCNC: 48 MG/DL (ref 0–150)
TSH W FREE THYROID IF ABNORMAL: 2.79 UIU/ML (ref 0.27–4.2)
VLDLC SERPL CALC-MCNC: 10 MG/DL (ref 6–23)
WBC # BLD AUTO: 5 K/UL (ref 4.3–11.1)

## 2025-01-22 PROCEDURE — 83036 HEMOGLOBIN GLYCOSYLATED A1C: CPT | Performed by: PHYSICIAN ASSISTANT

## 2025-01-22 PROCEDURE — 3079F DIAST BP 80-89 MM HG: CPT | Performed by: PHYSICIAN ASSISTANT

## 2025-01-22 PROCEDURE — 95251 CONT GLUC MNTR ANALYSIS I&R: CPT | Performed by: PHYSICIAN ASSISTANT

## 2025-01-22 PROCEDURE — 3074F SYST BP LT 130 MM HG: CPT | Performed by: PHYSICIAN ASSISTANT

## 2025-01-22 PROCEDURE — 99214 OFFICE O/P EST MOD 30 MIN: CPT | Performed by: PHYSICIAN ASSISTANT

## 2025-01-22 RX ORDER — INSULIN PMP CART,AUT,G6/7,CNTR
EACH SUBCUTANEOUS
Qty: 30 EACH | Refills: 3 | Status: SHIPPED | OUTPATIENT
Start: 2025-01-22

## 2025-01-22 RX ORDER — LOSARTAN POTASSIUM 50 MG/1
50 TABLET ORAL DAILY
Qty: 90 TABLET | Refills: 3 | Status: SHIPPED | OUTPATIENT
Start: 2025-01-22

## 2025-01-22 RX ORDER — BUTYROSPERMUM PARKII(SHEA BUTTER), SIMMONDSIA CHINENSIS (JOJOBA) SEED OIL, ALOE BARBADENSIS LEAF EXTRACT .01; 1; 3.5 G/100G; G/100G; G/100G
2000 LIQUID TOPICAL DAILY
Qty: 90 CAPSULE | Refills: 1 | Status: SHIPPED | OUTPATIENT
Start: 2025-01-22

## 2025-01-22 RX ORDER — CLOTRIMAZOLE 1 %
CREAM (GRAM) TOPICAL 2 TIMES DAILY
Qty: 60 G | Refills: 1 | Status: SHIPPED | OUTPATIENT
Start: 2025-01-22

## 2025-01-22 RX ORDER — INSULIN LISPRO 100 [IU]/ML
INJECTION, SOLUTION INTRAVENOUS; SUBCUTANEOUS
Qty: 70 ML | Refills: 3 | Status: SHIPPED | OUTPATIENT
Start: 2025-01-22 | End: 2025-01-22 | Stop reason: SDUPTHER

## 2025-01-22 RX ORDER — INSULIN DEGLUDEC INJECTION 100 U/ML
INJECTION, SOLUTION SUBCUTANEOUS
Qty: 10 ML | Refills: 1
Start: 2025-01-22

## 2025-01-22 RX ORDER — ATORVASTATIN CALCIUM 20 MG/1
20 TABLET, FILM COATED ORAL DAILY
Qty: 90 TABLET | Refills: 1 | Status: SHIPPED | OUTPATIENT
Start: 2025-01-22

## 2025-01-22 RX ORDER — INSULIN LISPRO 100 [IU]/ML
INJECTION, SOLUTION INTRAVENOUS; SUBCUTANEOUS
Qty: 70 ML | Refills: 3 | Status: SHIPPED | OUTPATIENT
Start: 2025-01-22

## 2025-01-22 RX ORDER — ACYCLOVIR 400 MG/1
TABLET ORAL
Qty: 9 EACH | Refills: 3 | Status: SHIPPED | OUTPATIENT
Start: 2025-01-22

## 2025-01-22 NOTE — TELEPHONE ENCOUNTER
Labs look good overall however vitamin D remains low    I know he sometimes take OTC vit D3    I would encourage pt to take 2000iu of D3 daily with food. Sent to pharmacy

## 2025-01-22 NOTE — PROGRESS NOTES
6.7%   10/29/2020      6.7%   02/04/2021          7.3%   05/11/2021          7.4%    08/31/2021              7.2%    02/17/2022             7.6%  06/24/2022              7.8%  10/27/2022  7.8%  02/20/2023  8.0%  06/20/2023  7.1%  11/02/2023  7.3%  02/06/2024  7.4%  6/12/2024  7.3%  1/22/2025  7.4%     Hemoglobin A1C, POC   Date Value Ref Range Status   01/22/2025 7.4 % Final   06/12/2024 7.3 % Final   02/06/2024 7.4 % Final        Thyroid:   06/13/2018      2.670               01/02/2019      4.10               07/02/2019      3.510               01/07/2020      2.340                   02/04/2021      2.650    11/02/2023 3.51      Lab Results   Component Value Date/Time    TSH 2.360 02/17/2022 09:38 AM    TSH 2.650 02/04/2021 09:38 AM    TSH 2.340 01/07/2020 09:35 AM     Vitamin D         Reviewed and updated this visit by provider:  Tobacco  Allergies  Meds  Problems  Med Hx  Surg Hx  Fam Hx                    Allergies & Medications:  Reviewed in chart.        Review of Systems    Vital Signs:  /82 (Site: Right Upper Arm, Position: Sitting, Cuff Size: Medium Adult)   Pulse 77   Ht 1.575 m (5' 2\")   Wt 82.8 kg (182 lb 9.6 oz)   SpO2 96%   BMI 33.40 kg/m²       Physical Exam  Constitutional:       Appearance: Normal appearance.   Neck:      Thyroid: No thyromegaly.   Cardiovascular:      Rate and Rhythm: Normal rate and regular rhythm.   Pulmonary:      Effort: Pulmonary effort is normal.      Breath sounds: Normal breath sounds.   Abdominal:      Palpations: Abdomen is soft.   Feet:      Right foot:      Protective Sensation: 3 sites tested.  3 sites sensed.      Left foot:      Protective Sensation: 3 sites tested.  3 sites sensed.   Lymphadenopathy:      Cervical: No cervical adenopathy.   Skin:     General: Skin is warm and dry.   Neurological:      General: No focal deficit present.      Mental Status: He is alert.   Psychiatric:         Mood and Affect: Mood normal.         Behavior: Behavior

## 2025-03-11 ENCOUNTER — OFFICE VISIT (OUTPATIENT)
Dept: FAMILY MEDICINE CLINIC | Facility: CLINIC | Age: 55
End: 2025-03-11
Payer: COMMERCIAL

## 2025-03-11 VITALS
HEART RATE: 79 BPM | SYSTOLIC BLOOD PRESSURE: 144 MMHG | TEMPERATURE: 98.4 F | WEIGHT: 188 LBS | BODY MASS INDEX: 35.5 KG/M2 | OXYGEN SATURATION: 98 % | DIASTOLIC BLOOD PRESSURE: 87 MMHG | HEIGHT: 61 IN

## 2025-03-11 DIAGNOSIS — E10.9 CONTROLLED TYPE 1 DIABETES MELLITUS, WITH LONG-TERM CURRENT USE OF INSULIN (HCC): ICD-10-CM

## 2025-03-11 DIAGNOSIS — I10 PRIMARY HYPERTENSION: ICD-10-CM

## 2025-03-11 DIAGNOSIS — Z12.11 SCREENING FOR MALIGNANT NEOPLASM OF COLON: ICD-10-CM

## 2025-03-11 DIAGNOSIS — E78.00 HYPERCHOLESTEROLEMIA: ICD-10-CM

## 2025-03-11 DIAGNOSIS — E10.40 TYPE 1 DIABETES MELLITUS WITH DIABETIC NEUROPATHY (HCC): ICD-10-CM

## 2025-03-11 DIAGNOSIS — Z00.00 ROUTINE GENERAL MEDICAL EXAMINATION AT A HEALTH CARE FACILITY: Primary | ICD-10-CM

## 2025-03-11 DIAGNOSIS — E10.3393 MODERATE NONPROLIFERATIVE DIABETIC RETINOPATHY OF BOTH EYES WITHOUT MACULAR EDEMA ASSOCIATED WITH TYPE 1 DIABETES MELLITUS (HCC): ICD-10-CM

## 2025-03-11 PROCEDURE — 3077F SYST BP >= 140 MM HG: CPT | Performed by: FAMILY MEDICINE

## 2025-03-11 PROCEDURE — 3079F DIAST BP 80-89 MM HG: CPT | Performed by: FAMILY MEDICINE

## 2025-03-11 PROCEDURE — 99396 PREV VISIT EST AGE 40-64: CPT | Performed by: FAMILY MEDICINE

## 2025-03-11 ASSESSMENT — PATIENT HEALTH QUESTIONNAIRE - PHQ9
SUM OF ALL RESPONSES TO PHQ QUESTIONS 1-9: 0
1. LITTLE INTEREST OR PLEASURE IN DOING THINGS: NOT AT ALL
2. FEELING DOWN, DEPRESSED OR HOPELESS: NOT AT ALL
SUM OF ALL RESPONSES TO PHQ QUESTIONS 1-9: 0

## 2025-03-11 NOTE — PROGRESS NOTES
(COZAAR) 50 MG tablet Take 1 tablet by mouth daily 90 tablet 3    Insulin Disposable Pump (OMNIPOD 5 [id number] PODS GEN 5) MISC Use to deliver insulin, E10.65 30 each 3    clotrimazole (LOTRIMIN) 1 % cream Apply topically 2 times daily 60 g 1    Insulin Infusion Pump MAILE Pump settings: OMNIPOD 5 standard pattern- 24 hour total 19.2 units Time MN  0.8 Carb Ratio MN  9.2 Insulin Sensitivity 45 Target low  , 7a 120, 7pm 110 correct above  7a 150 7p 140 Active Insulin time 2:30 Max Bolus 15 units 1 each 0    TRESIBA 100 UNIT/ML SOLN IN CASE OF PUMP FAILURE, 18 units SQ daily 10 mL 1    HUMALOG 100 UNIT/ML SOLN injection vial Use with insulin pump, max daily dose 75 units 70 mL 3    vitamin D (D3 2000) 50 MCG (2000 UT) CAPS capsule Take 1 capsule by mouth daily 90 capsule 1    aspirin 81 MG chewable tablet Take 1 tablet by mouth daily      Multiple Vitamins-Minerals (MULTIVITAMIN-MINERALS) TABS tablet Take 1 tablet by mouth daily      Continuous Blood Gluc Transmit (DEXCOM G6 TRANSMITTER) MISC USE TO MONITOR BLOOD GLUCOSE, E10.65 1 each 3    Continuous Blood Gluc Sensor (DEXCOM G6 SENSOR) MISC USE TO MONITOR BLOOD GLUCOSE, E10.65 9 each 3    albuterol sulfate HFA (VENTOLIN HFA) 108 (90 Base) MCG/ACT inhaler Inhale 2 puffs into the lungs 4 times daily as needed for Wheezing 18 g 0    Continuous Glucose Sensor (DEXCOM G7 SENSOR) MISC COMPATIBLE with TANDEM PUMP, Change every 10 days, E10.65 dispense with underline below ID number on side of box (Patient not taking: Reported on 3/11/2025) 9 each 3     No current facility-administered medications for this visit.       Mr. Marshall  reports that he quit smoking about 9 years ago. His smoking use included cigarettes. He started smoking about 10 years ago. He has a 0.3 pack-year smoking history. He has never used smokeless tobacco. He reports current alcohol use. He reports that he does not use drugs.    Mr. Marshall family history includes Asthma in his brother;

## 2025-04-25 ENCOUNTER — OFFICE VISIT (OUTPATIENT)
Dept: ENDOCRINOLOGY | Age: 55
End: 2025-04-25
Payer: COMMERCIAL

## 2025-04-25 VITALS
HEART RATE: 78 BPM | SYSTOLIC BLOOD PRESSURE: 124 MMHG | BODY MASS INDEX: 33.38 KG/M2 | DIASTOLIC BLOOD PRESSURE: 80 MMHG | HEIGHT: 62 IN | WEIGHT: 181.4 LBS | OXYGEN SATURATION: 98 %

## 2025-04-25 DIAGNOSIS — E55.9 VITAMIN D DEFICIENCY: ICD-10-CM

## 2025-04-25 DIAGNOSIS — E10.40 TYPE 1 DIABETES MELLITUS WITH DIABETIC NEUROPATHY (HCC): Primary | ICD-10-CM

## 2025-04-25 DIAGNOSIS — E78.00 HYPERCHOLESTEROLEMIA: ICD-10-CM

## 2025-04-25 DIAGNOSIS — I10 PRIMARY HYPERTENSION: ICD-10-CM

## 2025-04-25 DIAGNOSIS — E10.3393 MODERATE NONPROLIFERATIVE DIABETIC RETINOPATHY OF BOTH EYES WITHOUT MACULAR EDEMA ASSOCIATED WITH TYPE 1 DIABETES MELLITUS (HCC): ICD-10-CM

## 2025-04-25 DIAGNOSIS — Z96.41 INSULIN PUMP IN PLACE: ICD-10-CM

## 2025-04-25 LAB — HBA1C MFR BLD: 7.2 %

## 2025-04-25 PROCEDURE — 99214 OFFICE O/P EST MOD 30 MIN: CPT | Performed by: PHYSICIAN ASSISTANT

## 2025-04-25 PROCEDURE — 3051F HG A1C>EQUAL 7.0%<8.0%: CPT | Performed by: PHYSICIAN ASSISTANT

## 2025-04-25 PROCEDURE — 83036 HEMOGLOBIN GLYCOSYLATED A1C: CPT | Performed by: PHYSICIAN ASSISTANT

## 2025-04-25 PROCEDURE — 3079F DIAST BP 80-89 MM HG: CPT | Performed by: PHYSICIAN ASSISTANT

## 2025-04-25 PROCEDURE — 3074F SYST BP LT 130 MM HG: CPT | Performed by: PHYSICIAN ASSISTANT

## 2025-04-25 PROCEDURE — 95251 CONT GLUC MNTR ANALYSIS I&R: CPT | Performed by: PHYSICIAN ASSISTANT

## 2025-04-25 NOTE — PROGRESS NOTES
[facility] ENDOCRINOLOGY   AND   THYROID NODULE CLINIC    Kylie Frias PA-C  [facility] Endocrinology and Thyroid Nodule [facility]  [address] [place] [address], [address] [address]  Phone [phone]  Facsimile [phone]          Linwood Marshall is a 54 y.o. male seen 4/25/2025 for follow up evaluation of type 1 diabetes on insulin pump        Assessment and Plan:        Interpretation of 72 hour glucose monitor:  At least 72 hours of data were reviewed.  The patient utilizes a dexcom G6 continuous glucose monitoring system.  The average glucose during the reviewed timeframe was 152 with a standard deviation of 53.  There is a pattern of frequent post prandial hyperglycemia      Assessment & Plan      1. Type 1 diabetes mellitus with diabetic neuropathy (HCC)  1c improved to 7.2, estimated 6.9 from CGM.  Treatment plan: Advised to download G7 cesar, early bolusing, use activity mode, keep protein drinks/bars for pre-bolus without meal. Take pictures of G7 error codes. Rotate pod placement to more areas or broader area, use alcohol preps before applying pump. Continue using sensor for bolusing, monitor issues.    - AMB POC HEMOGLOBIN A1C  - Comprehensive Metabolic Panel; Future  - CBC with Auto Differential; Future  - Albumin/Creatinine Ratio, Urine; Future  - Lipid Panel; Future  - TSH reflex to FT4; Future  - HM DIABETES FOOT EXAM  - GLUCOSE MONITOR, 72 HOUR, PHYS INTERP  - Insulin Infusion Pump MAILE; Pump settings: OMNIPOD 5 standard pattern- 24 hour total 19.2 units Time MN  0.8 Carb Ratio MN  9 Insulin Sensitivity 45 Target low  , 7a 120, 7pm 110 correct above  7a 150 7p 140 Active Insulin time 2:00 Max Bolus 15 units  Dispense: 1 each; Refill: 0    2. Insulin pump in place  Adjust carb ratio from 9.2 down to 9,   IOB from 2.5 down to 2 hours.     - Insulin Infusion Pump MAILE; Pump settings: OMNIPOD 5 standard pattern- 24 hour total 19.2 units Time MN  0.8 Carb Ratio MN  9 Insulin

## 2025-05-01 DIAGNOSIS — E10.40 TYPE 1 DIABETES MELLITUS WITH DIABETIC NEUROPATHY (HCC): ICD-10-CM

## 2025-05-01 RX ORDER — PROCHLORPERAZINE 25 MG/1
SUPPOSITORY RECTAL
Refills: 3 | OUTPATIENT
Start: 2025-05-01

## 2025-05-01 RX ORDER — ACYCLOVIR 400 MG/1
TABLET ORAL
Qty: 9 EACH | Refills: 3 | Status: SHIPPED | OUTPATIENT
Start: 2025-05-01

## 2025-08-15 ENCOUNTER — OFFICE VISIT (OUTPATIENT)
Dept: ENDOCRINOLOGY | Age: 55
End: 2025-08-15

## 2025-08-15 VITALS
BODY MASS INDEX: 31.34 KG/M2 | DIASTOLIC BLOOD PRESSURE: 82 MMHG | HEART RATE: 66 BPM | HEIGHT: 64 IN | WEIGHT: 183.6 LBS | OXYGEN SATURATION: 98 % | SYSTOLIC BLOOD PRESSURE: 130 MMHG

## 2025-08-15 DIAGNOSIS — I10 PRIMARY HYPERTENSION: ICD-10-CM

## 2025-08-15 DIAGNOSIS — E78.00 HYPERCHOLESTEROLEMIA: ICD-10-CM

## 2025-08-15 DIAGNOSIS — E55.9 VITAMIN D DEFICIENCY: ICD-10-CM

## 2025-08-15 DIAGNOSIS — Z96.41 INSULIN PUMP IN PLACE: ICD-10-CM

## 2025-08-15 DIAGNOSIS — E10.40 TYPE 1 DIABETES MELLITUS WITH DIABETIC NEUROPATHY (HCC): ICD-10-CM

## 2025-08-15 DIAGNOSIS — E10.40 TYPE 1 DIABETES MELLITUS WITH DIABETIC NEUROPATHY (HCC): Primary | ICD-10-CM

## 2025-08-15 DIAGNOSIS — E10.3393 MODERATE NONPROLIFERATIVE DIABETIC RETINOPATHY OF BOTH EYES WITHOUT MACULAR EDEMA ASSOCIATED WITH TYPE 1 DIABETES MELLITUS (HCC): ICD-10-CM

## 2025-08-15 LAB
25(OH)D3 SERPL-MCNC: 32.3 NG/ML (ref 30–100)
ALBUMIN SERPL-MCNC: 3.9 G/DL (ref 3.5–5)
ALBUMIN/GLOB SERPL: 1.1 (ref 1–1.9)
ALP SERPL-CCNC: 83 U/L (ref 40–129)
ALT SERPL-CCNC: 45 U/L (ref 8–55)
ANION GAP SERPL CALC-SCNC: 8 MMOL/L (ref 7–16)
AST SERPL-CCNC: 36 U/L (ref 15–37)
BASOPHILS # BLD: 0.05 K/UL (ref 0–0.2)
BASOPHILS NFR BLD: 0.9 % (ref 0–2)
BILIRUB SERPL-MCNC: 0.4 MG/DL (ref 0–1.2)
BUN SERPL-MCNC: 18 MG/DL (ref 6–23)
CALCIUM SERPL-MCNC: 9.7 MG/DL (ref 8.8–10.2)
CHLORIDE SERPL-SCNC: 101 MMOL/L (ref 98–107)
CHOLEST SERPL-MCNC: 202 MG/DL (ref 0–200)
CO2 SERPL-SCNC: 28 MMOL/L (ref 20–29)
CREAT SERPL-MCNC: 1.2 MG/DL (ref 0.8–1.3)
CREAT UR-MCNC: 37.7 MG/DL (ref 39–259)
DIFFERENTIAL METHOD BLD: ABNORMAL
EOSINOPHIL # BLD: 0.34 K/UL (ref 0–0.8)
EOSINOPHIL NFR BLD: 6.2 % (ref 0.5–7.8)
ERYTHROCYTE [DISTWIDTH] IN BLOOD BY AUTOMATED COUNT: 13 % (ref 11.9–14.6)
GLOBULIN SER CALC-MCNC: 3.6 G/DL (ref 2.3–3.5)
GLUCOSE SERPL-MCNC: 140 MG/DL (ref 70–99)
HBA1C MFR BLD: 7 %
HCT VFR BLD AUTO: 41.2 % (ref 41.1–50.3)
HDLC SERPL-MCNC: 77 MG/DL (ref 40–60)
HDLC SERPL: 2.6 (ref 0–5)
HGB BLD-MCNC: 13.4 G/DL (ref 13.6–17.2)
IMM GRANULOCYTES # BLD AUTO: 0.01 K/UL (ref 0–0.5)
IMM GRANULOCYTES NFR BLD AUTO: 0.2 % (ref 0–5)
LDLC SERPL CALC-MCNC: 114 MG/DL (ref 0–100)
LYMPHOCYTES # BLD: 1.54 K/UL (ref 0.5–4.6)
LYMPHOCYTES NFR BLD: 28.1 % (ref 13–44)
MCH RBC QN AUTO: 30 PG (ref 26.1–32.9)
MCHC RBC AUTO-ENTMCNC: 32.5 G/DL (ref 31.4–35)
MCV RBC AUTO: 92.4 FL (ref 82–102)
MICROALBUMIN UR-MCNC: <1.2 MG/DL (ref 0–20)
MICROALBUMIN/CREAT UR-RTO: ABNORMAL MG/G (ref 0–30)
MONOCYTES # BLD: 0.55 K/UL (ref 0.1–1.3)
MONOCYTES NFR BLD: 10 % (ref 4–12)
NEUTS SEG # BLD: 3 K/UL (ref 1.7–8.2)
NEUTS SEG NFR BLD: 54.6 % (ref 43–78)
NRBC # BLD: 0 K/UL (ref 0–0.2)
PLATELET # BLD AUTO: 231 K/UL (ref 150–450)
PMV BLD AUTO: 10.2 FL (ref 9.4–12.3)
POTASSIUM SERPL-SCNC: 4.8 MMOL/L (ref 3.5–5.1)
PROT SERPL-MCNC: 7.5 G/DL (ref 6.3–8.2)
RBC # BLD AUTO: 4.46 M/UL (ref 4.23–5.6)
SODIUM SERPL-SCNC: 137 MMOL/L (ref 136–145)
TRIGL SERPL-MCNC: 57 MG/DL (ref 0–150)
TSH W FREE THYROID IF ABNORMAL: 4.07 UIU/ML (ref 0.27–4.2)
VLDLC SERPL CALC-MCNC: 11 MG/DL (ref 6–23)
WBC # BLD AUTO: 5.5 K/UL (ref 4.3–11.1)

## 2025-08-15 RX ORDER — INSULIN LISPRO 100 [IU]/ML
INJECTION, SOLUTION INTRAVENOUS; SUBCUTANEOUS
Qty: 70 ML | Refills: 3 | Status: SHIPPED | OUTPATIENT
Start: 2025-08-15

## 2025-08-15 RX ORDER — INSULIN LISPRO 100 [IU]/ML
INJECTION, SOLUTION INTRAVENOUS; SUBCUTANEOUS
Qty: 70 ML | Refills: 3 | Status: SHIPPED | OUTPATIENT
Start: 2025-08-15 | End: 2025-08-15 | Stop reason: SDUPTHER

## 2025-08-15 RX ORDER — ACYCLOVIR 400 MG/1
TABLET ORAL
Qty: 9 EACH | Refills: 3 | Status: SHIPPED | OUTPATIENT
Start: 2025-08-15

## 2025-08-15 RX ORDER — INSULIN PMP CART,AUT,G6/7,CNTR
EACH SUBCUTANEOUS
Qty: 45 EACH | Refills: 3 | Status: SHIPPED | OUTPATIENT
Start: 2025-08-15